# Patient Record
Sex: MALE | Race: WHITE
[De-identification: names, ages, dates, MRNs, and addresses within clinical notes are randomized per-mention and may not be internally consistent; named-entity substitution may affect disease eponyms.]

---

## 2017-02-20 ENCOUNTER — HOSPITAL ENCOUNTER (EMERGENCY)
Dept: HOSPITAL 47 - EC | Age: 77
Discharge: HOME | End: 2017-02-20
Payer: MEDICARE

## 2017-02-20 VITALS — SYSTOLIC BLOOD PRESSURE: 140 MMHG | HEART RATE: 69 BPM | DIASTOLIC BLOOD PRESSURE: 81 MMHG | RESPIRATION RATE: 18 BRPM

## 2017-02-20 VITALS — TEMPERATURE: 98 F

## 2017-02-20 DIAGNOSIS — Z79.82: ICD-10-CM

## 2017-02-20 DIAGNOSIS — J42: ICD-10-CM

## 2017-02-20 DIAGNOSIS — Z79.52: ICD-10-CM

## 2017-02-20 DIAGNOSIS — E07.9: ICD-10-CM

## 2017-02-20 DIAGNOSIS — M79.604: Primary | ICD-10-CM

## 2017-02-20 DIAGNOSIS — Z87.891: ICD-10-CM

## 2017-02-20 DIAGNOSIS — M25.561: ICD-10-CM

## 2017-02-20 PROCEDURE — 99284 EMERGENCY DEPT VISIT MOD MDM: CPT
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
## 2017-02-20 NOTE — US
EXAMINATION TYPE: US venous doppler duplex LE RT

 

DATE OF EXAM: 2/20/2017 9:32 AM

 

TECHNIQUE: Duplex Doppler ultrasound examination of the right lower extremity.

 

COMPARISON: NONE

 

CLINICAL HISTORY: 76-year-old male with Pain.

 

SIDE PERFORMED: Right  

 

FINDINGS:

 

VESSELS IMAGED:

External Iliac Vein (EIV)

Common Femoral Vein

Deep Femoral Vein

Greater Saphenous Vein *

Femoral Vein

Popliteal Vein

Small Saphenous Vein *

Proximal Calf Veins

(* superficial vessels)

 

Right Leg:  Negative for DVT

 

 

 

 

 

IMPRESSION:  

No evidence for DVT within the right lower extremity imaged from the groin to the upper calf.

## 2017-02-20 NOTE — XR
EXAMINATION TYPE: XR knee complete RT

 

DATE OF EXAM: 2/20/2017 9:39 AM

 

CLINICAL HISTORY: pain

 

TECHNIQUE: Frontal, lateral and oblique images of the right foot are obtained.

 

COMPARISON: None.

 

FINDINGS:  There is no acute fracture/dislocation evident. The joint spaces  appear mildly narrowed. 
Small patellar joint effusion noted. The overlying soft tissue appears unremarkable.

 

IMPRESSION:  

There is no acute fracture or dislocation.

 

ICD 10 NO FRACTURE, INITIAL EVALUATION

## 2018-04-10 ENCOUNTER — HOSPITAL ENCOUNTER (INPATIENT)
Dept: HOSPITAL 47 - EC | Age: 78
LOS: 3 days | Discharge: HOME | DRG: 381 | End: 2018-04-13
Payer: MEDICARE

## 2018-04-10 VITALS — BODY MASS INDEX: 29.2 KG/M2

## 2018-04-10 DIAGNOSIS — Z98.41: ICD-10-CM

## 2018-04-10 DIAGNOSIS — D62: ICD-10-CM

## 2018-04-10 DIAGNOSIS — R47.02: ICD-10-CM

## 2018-04-10 DIAGNOSIS — Z79.82: ICD-10-CM

## 2018-04-10 DIAGNOSIS — K57.30: ICD-10-CM

## 2018-04-10 DIAGNOSIS — I10: ICD-10-CM

## 2018-04-10 DIAGNOSIS — E03.9: ICD-10-CM

## 2018-04-10 DIAGNOSIS — H26.9: ICD-10-CM

## 2018-04-10 DIAGNOSIS — Z72.89: ICD-10-CM

## 2018-04-10 DIAGNOSIS — K44.9: ICD-10-CM

## 2018-04-10 DIAGNOSIS — E78.5: ICD-10-CM

## 2018-04-10 DIAGNOSIS — K21.9: ICD-10-CM

## 2018-04-10 DIAGNOSIS — K22.70: Primary | ICD-10-CM

## 2018-04-10 DIAGNOSIS — Z79.890: ICD-10-CM

## 2018-04-10 DIAGNOSIS — Z87.891: ICD-10-CM

## 2018-04-10 DIAGNOSIS — J42: ICD-10-CM

## 2018-04-10 LAB
ALBUMIN SERPL-MCNC: 4 G/DL (ref 3.5–5)
ALP SERPL-CCNC: 56 U/L (ref 38–126)
ALT SERPL-CCNC: 29 U/L (ref 21–72)
ANION GAP SERPL CALC-SCNC: 14 MMOL/L
APTT BLD: 21.7 SEC (ref 22–30)
AST SERPL-CCNC: 25 U/L (ref 17–59)
BASOPHILS # BLD AUTO: 0.1 K/UL (ref 0–0.2)
BASOPHILS NFR BLD AUTO: 1 %
BUN SERPL-SCNC: 30 MG/DL (ref 9–20)
CALCIUM SPEC-MCNC: 9.1 MG/DL (ref 8.4–10.2)
CHLORIDE SERPL-SCNC: 101 MMOL/L (ref 98–107)
CK SERPL-CCNC: 66 U/L (ref 55–170)
CO2 SERPL-SCNC: 25 MMOL/L (ref 22–30)
EOSINOPHIL # BLD AUTO: 0.2 K/UL (ref 0–0.7)
EOSINOPHIL NFR BLD AUTO: 3 %
ERYTHROCYTE [DISTWIDTH] IN BLOOD BY AUTOMATED COUNT: 4.4 M/UL (ref 4.3–5.9)
ERYTHROCYTE [DISTWIDTH] IN BLOOD: 13.2 % (ref 11.5–15.5)
GLUCOSE SERPL-MCNC: 133 MG/DL (ref 74–99)
HCT VFR BLD AUTO: 37.7 % (ref 39–53)
HGB BLD-MCNC: 13.2 GM/DL (ref 13–17.5)
INR PPP: 1 (ref ?–1.2)
LIPASE SERPL-CCNC: 166 U/L (ref 23–300)
LYMPHOCYTES # SPEC AUTO: 1.9 K/UL (ref 1–4.8)
LYMPHOCYTES NFR SPEC AUTO: 28 %
MAGNESIUM SPEC-SCNC: 2 MG/DL (ref 1.6–2.3)
MCH RBC QN AUTO: 29.9 PG (ref 25–35)
MCHC RBC AUTO-ENTMCNC: 34.9 G/DL (ref 31–37)
MCV RBC AUTO: 85.6 FL (ref 80–100)
MONOCYTES # BLD AUTO: 0.5 K/UL (ref 0–1)
MONOCYTES NFR BLD AUTO: 7 %
NEUTROPHILS # BLD AUTO: 4.2 K/UL (ref 1.3–7.7)
NEUTROPHILS NFR BLD AUTO: 60 %
PLATELET # BLD AUTO: 208 K/UL (ref 150–450)
POTASSIUM SERPL-SCNC: 4.2 MMOL/L (ref 3.5–5.1)
PROT SERPL-MCNC: 6.4 G/DL (ref 6.3–8.2)
PT BLD: 9.6 SEC (ref 9–12)
SODIUM SERPL-SCNC: 140 MMOL/L (ref 137–145)
TROPONIN I SERPL-MCNC: <0.012 NG/ML (ref 0–0.03)
WBC # BLD AUTO: 7 K/UL (ref 3.8–10.6)

## 2018-04-10 PROCEDURE — 85610 PROTHROMBIN TIME: CPT

## 2018-04-10 PROCEDURE — 80048 BASIC METABOLIC PNL TOTAL CA: CPT

## 2018-04-10 PROCEDURE — 84100 ASSAY OF PHOSPHORUS: CPT

## 2018-04-10 PROCEDURE — 43239 EGD BIOPSY SINGLE/MULTIPLE: CPT

## 2018-04-10 PROCEDURE — 86850 RBC ANTIBODY SCREEN: CPT

## 2018-04-10 PROCEDURE — 99285 EMERGENCY DEPT VISIT HI MDM: CPT

## 2018-04-10 PROCEDURE — 80053 COMPREHEN METABOLIC PANEL: CPT

## 2018-04-10 PROCEDURE — 86901 BLOOD TYPING SEROLOGIC RH(D): CPT

## 2018-04-10 PROCEDURE — 80320 DRUG SCREEN QUANTALCOHOLS: CPT

## 2018-04-10 PROCEDURE — 88305 TISSUE EXAM BY PATHOLOGIST: CPT

## 2018-04-10 PROCEDURE — 36415 COLL VENOUS BLD VENIPUNCTURE: CPT

## 2018-04-10 PROCEDURE — 83690 ASSAY OF LIPASE: CPT

## 2018-04-10 PROCEDURE — 85025 COMPLETE CBC W/AUTO DIFF WBC: CPT

## 2018-04-10 PROCEDURE — 81003 URINALYSIS AUTO W/O SCOPE: CPT

## 2018-04-10 PROCEDURE — 85027 COMPLETE CBC AUTOMATED: CPT

## 2018-04-10 PROCEDURE — 74022 RADEX COMPL AQT ABD SERIES: CPT

## 2018-04-10 PROCEDURE — 82553 CREATINE MB FRACTION: CPT

## 2018-04-10 PROCEDURE — 83735 ASSAY OF MAGNESIUM: CPT

## 2018-04-10 PROCEDURE — 85730 THROMBOPLASTIN TIME PARTIAL: CPT

## 2018-04-10 PROCEDURE — 84484 ASSAY OF TROPONIN QUANT: CPT

## 2018-04-10 PROCEDURE — 96374 THER/PROPH/DIAG INJ IV PUSH: CPT

## 2018-04-10 PROCEDURE — 82550 ASSAY OF CK (CPK): CPT

## 2018-04-10 PROCEDURE — 86900 BLOOD TYPING SEROLOGIC ABO: CPT

## 2018-04-10 NOTE — XR
EXAMINATION TYPE: XR abdomen acute w cxr

 

DATE OF EXAM: 4/10/2018

 

COMPARISON: Chest x-ray 10/27/2015

 

HISTORY: Vomiting blood. Chest pain.

 

TECHNIQUE:  4 views

 

FINDINGS:  

 

Lungs are clear. There is no heart failure. Heart size is normal.

 

There is no pleural effusion. Bowel gas pattern is normal. There is no sign of intestinal obstruction
 or pneumoperitoneum. Fecal pattern is normal. There are no pathologic calcifications over the kidney
s.

IMPRESSION: 

Nonacute abdomen. Chest is stable compared to old exam.
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
## 2018-04-11 LAB
ANION GAP SERPL CALC-SCNC: 7 MMOL/L
BASOPHILS # BLD AUTO: 0 K/UL (ref 0–0.2)
BASOPHILS NFR BLD AUTO: 1 %
BUN SERPL-SCNC: 31 MG/DL (ref 9–20)
CALCIUM SPEC-MCNC: 8.3 MG/DL (ref 8.4–10.2)
CHLORIDE SERPL-SCNC: 106 MMOL/L (ref 98–107)
CO2 SERPL-SCNC: 28 MMOL/L (ref 22–30)
EOSINOPHIL # BLD AUTO: 0.1 K/UL (ref 0–0.7)
EOSINOPHIL NFR BLD AUTO: 1 %
ERYTHROCYTE [DISTWIDTH] IN BLOOD BY AUTOMATED COUNT: 3.42 M/UL (ref 4.3–5.9)
ERYTHROCYTE [DISTWIDTH] IN BLOOD BY AUTOMATED COUNT: 3.58 M/UL (ref 4.3–5.9)
ERYTHROCYTE [DISTWIDTH] IN BLOOD BY AUTOMATED COUNT: 3.72 M/UL (ref 4.3–5.9)
ERYTHROCYTE [DISTWIDTH] IN BLOOD BY AUTOMATED COUNT: 3.74 M/UL (ref 4.3–5.9)
ERYTHROCYTE [DISTWIDTH] IN BLOOD: 13 % (ref 11.5–15.5)
ERYTHROCYTE [DISTWIDTH] IN BLOOD: 13.3 % (ref 11.5–15.5)
ERYTHROCYTE [DISTWIDTH] IN BLOOD: 13.3 % (ref 11.5–15.5)
ERYTHROCYTE [DISTWIDTH] IN BLOOD: 13.4 % (ref 11.5–15.5)
GLUCOSE BLD-MCNC: 127 MG/DL (ref 75–99)
GLUCOSE SERPL-MCNC: 108 MG/DL (ref 74–99)
HCT VFR BLD AUTO: 29.5 % (ref 39–53)
HCT VFR BLD AUTO: 31.1 % (ref 39–53)
HCT VFR BLD AUTO: 31.9 % (ref 39–53)
HCT VFR BLD AUTO: 32.5 % (ref 39–53)
HGB BLD-MCNC: 10.2 GM/DL (ref 13–17.5)
HGB BLD-MCNC: 10.6 GM/DL (ref 13–17.5)
HGB BLD-MCNC: 11 GM/DL (ref 13–17.5)
HGB BLD-MCNC: 11.1 GM/DL (ref 13–17.5)
LYMPHOCYTES # SPEC AUTO: 0.9 K/UL (ref 1–4.8)
LYMPHOCYTES NFR SPEC AUTO: 17 %
MAGNESIUM SPEC-SCNC: 2 MG/DL (ref 1.6–2.3)
MCH RBC QN AUTO: 29.3 PG (ref 25–35)
MCH RBC QN AUTO: 29.7 PG (ref 25–35)
MCH RBC QN AUTO: 29.7 PG (ref 25–35)
MCH RBC QN AUTO: 29.9 PG (ref 25–35)
MCHC RBC AUTO-ENTMCNC: 33.7 G/DL (ref 31–37)
MCHC RBC AUTO-ENTMCNC: 34.1 G/DL (ref 31–37)
MCHC RBC AUTO-ENTMCNC: 34.5 G/DL (ref 31–37)
MCHC RBC AUTO-ENTMCNC: 34.9 G/DL (ref 31–37)
MCV RBC AUTO: 85.6 FL (ref 80–100)
MCV RBC AUTO: 86.3 FL (ref 80–100)
MCV RBC AUTO: 86.8 FL (ref 80–100)
MCV RBC AUTO: 87.1 FL (ref 80–100)
MONOCYTES # BLD AUTO: 0.4 K/UL (ref 0–1)
MONOCYTES NFR BLD AUTO: 6 %
NEUTROPHILS # BLD AUTO: 4.1 K/UL (ref 1.3–7.7)
NEUTROPHILS NFR BLD AUTO: 72 %
PH UR: 7 [PH] (ref 5–8)
PLATELET # BLD AUTO: 160 K/UL (ref 150–450)
PLATELET # BLD AUTO: 174 K/UL (ref 150–450)
PLATELET # BLD AUTO: 175 K/UL (ref 150–450)
PLATELET # BLD AUTO: 176 K/UL (ref 150–450)
POTASSIUM SERPL-SCNC: 4.7 MMOL/L (ref 3.5–5.1)
SODIUM SERPL-SCNC: 141 MMOL/L (ref 137–145)
SP GR UR: 1.01 (ref 1–1.03)
UROBILINOGEN UR QL STRIP: <2 MG/DL (ref ?–2)
WBC # BLD AUTO: 4 K/UL (ref 3.8–10.6)
WBC # BLD AUTO: 5 K/UL (ref 3.8–10.6)
WBC # BLD AUTO: 5.1 K/UL (ref 3.8–10.6)
WBC # BLD AUTO: 5.6 K/UL (ref 3.8–10.6)

## 2018-04-11 RX ADMIN — PANTOPRAZOLE SODIUM SCH MG: 40 INJECTION, POWDER, FOR SOLUTION INTRAVENOUS at 09:38

## 2018-04-11 RX ADMIN — CEFAZOLIN SCH MLS/HR: 330 INJECTION, POWDER, FOR SOLUTION INTRAMUSCULAR; INTRAVENOUS at 01:39

## 2018-04-11 RX ADMIN — PANTOPRAZOLE SODIUM SCH MG: 40 INJECTION, POWDER, FOR SOLUTION INTRAVENOUS at 20:11

## 2018-04-11 RX ADMIN — CEFAZOLIN SCH MLS/HR: 330 INJECTION, POWDER, FOR SOLUTION INTRAMUSCULAR; INTRAVENOUS at 09:38

## 2018-04-11 RX ADMIN — CEFAZOLIN SCH MLS/HR: 330 INJECTION, POWDER, FOR SOLUTION INTRAMUSCULAR; INTRAVENOUS at 20:10
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
## 2018-04-12 VITALS — RESPIRATION RATE: 18 BRPM

## 2018-04-12 LAB
ANION GAP SERPL CALC-SCNC: 8 MMOL/L
BASOPHILS # BLD AUTO: 0 K/UL (ref 0–0.2)
BASOPHILS # BLD AUTO: 0 K/UL (ref 0–0.2)
BASOPHILS NFR BLD AUTO: 1 %
BASOPHILS NFR BLD AUTO: 1 %
BUN SERPL-SCNC: 15 MG/DL (ref 9–20)
CALCIUM SPEC-MCNC: 8.2 MG/DL (ref 8.4–10.2)
CHLORIDE SERPL-SCNC: 110 MMOL/L (ref 98–107)
CO2 SERPL-SCNC: 23 MMOL/L (ref 22–30)
EOSINOPHIL # BLD AUTO: 0.1 K/UL (ref 0–0.7)
EOSINOPHIL # BLD AUTO: 0.1 K/UL (ref 0–0.7)
EOSINOPHIL NFR BLD AUTO: 3 %
EOSINOPHIL NFR BLD AUTO: 3 %
ERYTHROCYTE [DISTWIDTH] IN BLOOD BY AUTOMATED COUNT: 3.51 M/UL (ref 4.3–5.9)
ERYTHROCYTE [DISTWIDTH] IN BLOOD BY AUTOMATED COUNT: 3.56 M/UL (ref 4.3–5.9)
ERYTHROCYTE [DISTWIDTH] IN BLOOD: 13.4 % (ref 11.5–15.5)
ERYTHROCYTE [DISTWIDTH] IN BLOOD: 13.5 % (ref 11.5–15.5)
GLUCOSE SERPL-MCNC: 96 MG/DL (ref 74–99)
HCT VFR BLD AUTO: 30.7 % (ref 39–53)
HCT VFR BLD AUTO: 30.9 % (ref 39–53)
HGB BLD-MCNC: 10.4 GM/DL (ref 13–17.5)
HGB BLD-MCNC: 10.6 GM/DL (ref 13–17.5)
LYMPHOCYTES # SPEC AUTO: 0.6 K/UL (ref 1–4.8)
LYMPHOCYTES # SPEC AUTO: 1 K/UL (ref 1–4.8)
LYMPHOCYTES NFR SPEC AUTO: 17 %
LYMPHOCYTES NFR SPEC AUTO: 26 %
MAGNESIUM SPEC-SCNC: 2.2 MG/DL (ref 1.6–2.3)
MCH RBC QN AUTO: 29.5 PG (ref 25–35)
MCH RBC QN AUTO: 29.6 PG (ref 25–35)
MCHC RBC AUTO-ENTMCNC: 33.6 G/DL (ref 31–37)
MCHC RBC AUTO-ENTMCNC: 34.4 G/DL (ref 31–37)
MCV RBC AUTO: 86.1 FL (ref 80–100)
MCV RBC AUTO: 87.9 FL (ref 80–100)
MONOCYTES # BLD AUTO: 0.2 K/UL (ref 0–1)
MONOCYTES # BLD AUTO: 0.3 K/UL (ref 0–1)
MONOCYTES NFR BLD AUTO: 6 %
MONOCYTES NFR BLD AUTO: 7 %
NEUTROPHILS # BLD AUTO: 2.3 K/UL (ref 1.3–7.7)
NEUTROPHILS # BLD AUTO: 2.7 K/UL (ref 1.3–7.7)
NEUTROPHILS NFR BLD AUTO: 60 %
NEUTROPHILS NFR BLD AUTO: 72 %
PLATELET # BLD AUTO: 151 K/UL (ref 150–450)
PLATELET # BLD AUTO: 156 K/UL (ref 150–450)
POTASSIUM SERPL-SCNC: 4.2 MMOL/L (ref 3.5–5.1)
SODIUM SERPL-SCNC: 141 MMOL/L (ref 137–145)
WBC # BLD AUTO: 3.7 K/UL (ref 3.8–10.6)
WBC # BLD AUTO: 3.8 K/UL (ref 3.8–10.6)

## 2018-04-12 PROCEDURE — 0DB58ZX EXCISION OF ESOPHAGUS, VIA NATURAL OR ARTIFICIAL OPENING ENDOSCOPIC, DIAGNOSTIC: ICD-10-PCS

## 2018-04-12 RX ADMIN — PANTOPRAZOLE SODIUM SCH MG: 40 INJECTION, POWDER, FOR SOLUTION INTRAVENOUS at 20:54

## 2018-04-12 RX ADMIN — PANTOPRAZOLE SODIUM SCH MG: 40 INJECTION, POWDER, FOR SOLUTION INTRAVENOUS at 07:50

## 2018-04-12 RX ADMIN — LEVOTHYROXINE SODIUM SCH MCG: 25 TABLET ORAL at 15:42

## 2018-04-12 RX ADMIN — CEFAZOLIN SCH MLS/HR: 330 INJECTION, POWDER, FOR SOLUTION INTRAMUSCULAR; INTRAVENOUS at 07:50

## 2018-04-12 RX ADMIN — CEFAZOLIN SCH MLS/HR: 330 INJECTION, POWDER, FOR SOLUTION INTRAMUSCULAR; INTRAVENOUS at 17:18

## 2018-04-12 RX ADMIN — Medication SCH MG: at 15:41

## 2018-04-12 RX ADMIN — CEFAZOLIN SCH MLS/HR: 330 INJECTION, POWDER, FOR SOLUTION INTRAMUSCULAR; INTRAVENOUS at 04:22

## 2018-04-12 NOTE — P.PN
Subjective


Progress Note Date: 04/12/18


Principal diagnosis: 


Acute blood loss anemia with coffee ground hematemesis and black tarry stools





Khoa is a 78-year-old white male patient of Dr. Boudreaux who presented to the 

emergency department on 04/10/2018 with 3 day history of black stools and 

coffee ground emesis.  In the emergency room room he also had some burgundy-

colored clots in the emesis upon arrival.  Denied any fevers, denied any 

chills.  Denied any lightheadedness or chest pain.  Denied any prior history of 

GI bleeding.  He drinks 2 beers on a regular basis, denies drinking any liquor.

  Acute abdominal series showed nonacute abdomen, and a stable chest.  Patient 

had a previous colonoscopy on 08/09/2016 by Dr. Werner, which showed sigmoid 

diverticulosis with no evidence of acute diverticulitis, strictures, polyps or 

cancer.  Other past medical history includes GERD/reflux, hyperlipidemia, 

hypertension, hypothyroidism, varicose veins for which patient follows with the 

vascular surgeon, nicotine dependence, which is currently in remission.  

Surgical history includes appendectomy, hernia repair, hemorrhoidectomy, 

cataract surgery on the right eye.  Initial lab work showed hemoglobin of 13.2 

on 04/10/2018, white count of 7.0, no evidence of coagulopathy, INR was 1.0, 

electrolytes were within normal limits, BUN was 30, creatinine is 1.0.  Liver 

enzymes, lipase were all within normal limits, cardiac enzymes and troponins 

were negative times one.  Serum alcohol was less than 10.  His labs hemoglobin 

is down to 11.1.  Patient has had no further episodes of hematemesis or black 

tarry stool since admission to the ICU.  Vital signs remain stable, he is 

hemodynamically stable.  He is awake alert, not requiring any supplemental 

oxygen.  He is afebrile.  Denies any distress, denies any chest pain or dyspnea

, denies any abdominal discomfort.  Patient remains nothing by mouth, and is 

awaiting to be evaluated by the GI service for possible EGD. 





On 04/12/2018 patient seen in follow-up in the intensive care unit.  He had one 

episode of dark stool since admission, it is hemoglobin is 10.4, no further 

episodes of hematemesis or coffee-ground emesis.  No leukocytosis, renal 

profile is within normal limits, electrolytes are within normal limits with 

exception of chloride which is at 110 on today's blood work.  Patient remains 

hemodynamically stable, he has not required any blood transfusions.  Denies any 

abdominal discomfort, denies any chest pain or dyspnea.  He is on room air, O2 

sat at 95%.  Maintenance IVs 0.9 at 125 ml/hr.  Patient is scheduled for EGD 

today. 








Objective





- Vital Signs


Vital signs: 


 Vital Signs











Temp  97.7 F   04/12/18 04:00


 


Pulse  76   04/12/18 07:00


 


Resp  17   04/12/18 07:00


 


BP  106/72   04/12/18 07:00


 


Pulse Ox  95   04/12/18 07:00








 Intake & Output











 04/11/18 04/12/18 04/12/18





 18:59 06:59 18:59


 


Intake Total 1825 2220 250


 


Output Total 2050 1075 325


 


Balance -225 1145 -75


 


Weight  86.1 kg 


 


Intake:   


 


  IV 1375 1500 250


 


    Sodium Chloride 0.9% 1, 1375 1500 250





    000 ml @ 125 mls/hr IV .   





    [facility] Rx#:[id number]   


 


  Oral 450 720 


 


Output:   


 


  Urine 2050 1075 325


 


Other:   


 


  Voiding Method Urinal Urinal 














- Exam


GENERAL EXAM: Alert, pleasant, 78-year-old white male that appears younger than 

his stated age, comfortable in no apparent distress.


HEAD: Normocephalic/atraumatic.


EYES: Normal reaction of pupils, equal size.  Conjunctiva pink, sclera white.


NOSE: Clear with pink turbinates.


THROAT: No erythema or exudates.


NECK: No masses, no JVD, no thyroid enlargement, no adenopathy.


CHEST: No chest wall deformity.  Symmetrical expansion. 


LUNGS: Clear lung sounds, no rhonchi, no wheezes or rales noted on today's exam.


CVS: Regular rate and rhythm, normal S1 and S2, no gallops, no murmurs, no rubs


ABDOMEN: Soft, nontender.  No hepatosplenomegaly, normal bowel sounds, no 

guarding or rigidity.


EXTREMITIES: No clubbing, no edema, no cyanosis, 2+ pulses and upper and lower 

extremities.


MUSCULOSKELETAL: Muscle strength and tone normal.


SPINE: No scoliosis or deformity


SKIN: No rashes


CENTRAL NERVOUS SYSTEM: Alert and oriented -3.  No focal deficits, tone is 

normal in all 4 extremities.


PSYCHIATRIC: Alert and oriented -3.  Appropriate affect.  Intact judgment and 

insight.











- Labs


CBC & Chem 7: 


 04/12/18 04:04





 04/12/18 04:04


Labs: 


 Abnormal Lab Results - Last 24 Hours (Table)











  04/11/18 04/11/18 04/11/18 Range/Units





  11:32 17:15 23:13 


 


RBC  3.74 L  3.58 L  3.42 L  (4.30-5.90)  m/uL


 


Hgb  11.0 L  10.6 L  10.2 L  (13.0-17.5)  gm/dL


 


Hct  32.5 L  31.1 L  29.5 L  (39.0-53.0)  %


 


Chloride     ()  mmol/L


 


Calcium     (8.4-10.2)  mg/dL














  04/12/18 04/12/18 Range/Units





  04:04 04:04 


 


RBC   3.51 L  (4.30-5.90)  m/uL


 


Hgb   10.4 L  (13.0-17.5)  gm/dL


 


Hct   30.9 L  (39.0-53.0)  %


 


Chloride  110 H   ()  mmol/L


 


Calcium  8.2 L   (8.4-10.2)  mg/dL














Assessment and Plan


Plan: 


Assessment:





#1.  Acute blood loss anemia with coffee ground hematemesis, and black tarry 

stools.  Hemoglobin went down from 13.2, down to 10.4





#2.  Chronic EtOH use, 2-3 beers on the daily basis, but no evidence of liver 

disease.





#3.  Dysphasia, has been treated with rounds of antibiotics per PCP.  limited 

oropharyngeal exam was negative for any evidence of exudates, redness, or 

swelling





#4.  History of diverticulosis as per colonoscopy from 08/09/2016





#5.  Hypertension, hyperlipidemia





#6.  GERD/reflux





#7.  Hypothyroidism





#8.  History of appendectomy, hernia repair, hemorrhoidectomy





#9.  Nicotine dependence, currently in remission





Plan:





Will await the results of the EGD today, patient has not had any recurrence of 

hematemesis or coffee ground emesis.  He states he had one episode of dark 

stool since admission, hemoglobin is down to 10.4 on today's exam, patient 

remains hemodynamically stable, denies any chest pain or dyspnea.  Patient can 

be transferred to the general medical floor today pending the results of the 

EGD.





I performed a history & physical examination of the patient and discussed their 

management with my nurse practitioner, Ariadna Foley.  I reviewed the nurse 

practitioner's note and agree with the documented findings and plan of care.  

Lung sounds are clear.  The findings and the impression was discussed with the 

patient.  I attest to the documentation by the nurse practitioner. 





Critical care time is over 45 minutes





Time with Patient: Greater than 30

## 2018-04-12 NOTE — P.PCN
Date of Procedure: 04/12/18


Procedure(s) Performed: 


Procedure: Esophagogastroduodenoscopy and biopsy.





Preoperative diagnosis: Upper GI bleeding.





Postoperative diagnosis: 1. 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 

with ulcerated surface but no active bleeding at the time of this exam.        

  2. Sliding hiatal hernia.  3. No obvious abnormalities in the stomach and 

duodenum.  4. Multiple biopsies obtained from the esophageal mass and a picture 

was taken to be included in the record.





Preparation and sedation: Was provided by anesthesia.





Brief clinical history: The patient is a 78-year-old male with a past medical 

history of GERD, hyperlipidemia, hypertension, daily beer consumption 2-3 beers 

a day admitted with acute hematemesis and melena 2 days.  Patient's had one 

moderate black colored bowel movement daily for 2 days prior to admission, as 

well as a few episodes of maroon colored hematemesis the day before admission.  

Reports mild indigestion and mild midepigastric discomfort.  No history of 

peptic ulcer disease or recent EGD.  Colonoscopy August 2016 screening for 

change in bowel habits identified sigmoid diverticulosis. Takes a baby aspirin 

daily no other antiplatelet or NSAID medications.  Denies weight loss, 

hematochezia, fever or chills.  Admission hemoglobin 13.2 dropped to 11.1.  

Platelet 175.  INR 1.0.  BUN 30.  Creatinine 1.0.  LFTs within normal limits.  

Lipase 166.  Acute abdominal series nonacute abdomen.  The details are 

summarized in the history and physical and dictated consultation and progress 

notes.  This evaluation is to assess for a source of upper GI bleeding.





Procedure: With the patient on his left lateral decubitus position and after 

informed consent and adequate sedation, I passed the Olympus- video 

upper endoscope through the cricopharyngeus down the esophagus.  GE junction 

was irregular and it started around 35-36 cm from the incisors and the tubular 

esophagus continues for another 4-5 cm.  The endoscope was then advanced 

through a hiatal hernia to the rest of the stomach which was insufflated with 

air and inspected in detail including the retroflex view in the cardia.  Finally

, the endoscope was passed through the pylorus into the duodenum.  Stomach, 

pyloric channel, duodenal bulb, post bulbar area and descending duodenum did 

not show any obvious abnormalities or bleeding.  The distal esophagus in the 

Colindres's segment showed an ulcerated mass measuring around 3 or 4 cm in 

greatest dimension.  There was friability but no spontaneous bleeding.  No 

other obvious abnormalities were seen in the esophagus or any evidence of 

active bleeding noted in this exam.  I obtained multiple biopsies from the 

esophageal mass as well as couple pictures then the endoscope was withdrawn.





The patient tolerated the procedure well.





Plan: The patient was briefed regarding the findings on this exam.  Will await 

biopsy results and make further plans.

## 2018-04-12 NOTE — P.PN
Subjective


Progress Note Date: 04/12/18





Khoa Fuentes is a 78-year-old male well known to my practice who presented to 

the emergency department on 04/10/2018 with 3 day history of black stools and 

coffee ground emesis.  In the emergency room room he also had more bloody 

emesis with blood clots in the emesis.  .  Denied any lightheadedness or chest 

pain.  He denied any prior history of GI bleeding.  He drinks 2-3 beers daily, 

denies drinking any liquor.  Patient had a previous colonoscopy in 2016 by Dr. Lopez, which showed sigmoid diverticulosis.


Initial lab work showed hemoglobin of 13.2 on 04/10/2018, white count of 7.0, 

no evidence of coagulopathy, INR was 1.0, electrolytes were within normal limits

, BUN was 30, creatinine is 1.0.  Liver enzymes, lipase were all within normal 

limits. Serum alcohol was less than 10.  His labs hemoglobin is down to 11.1.  

Patient has had no further episodes of hematemesis or black tarry stool since 

admission to the ICU.  Vital signs remain stable, he is hemodynamically stable.

  


He is awake alert, not requiring any supplemental oxygen.  He is afebrile.  

Denies any distress, denies any chest pain or dyspnea, denies any abdominal 

discomfort.  Patient remains nothing by mouth, and is awaiting to be evaluated 

by the GI service for possible EGD. 





04/12/2018 patient lying in bed comfortably.  He is scheduled for an EGD this 

afternoon.  Hemoglobin 10.4.  He's had no further episodes of vomiting since in 

the ER.  Denies abdominal pain.  Denies any chest pain or shortness of breath.  

Patient did have one dark stool yesterday.  Denies any burning with urination.  

Denies any tremors.  Denies any anxiety or agitation.





Objective





- Vital Signs


Vital signs: 


 Vital Signs











Temp  98.1 F   04/12/18 08:00


 


Pulse  70   04/12/18 09:00


 


Resp  16   04/12/18 09:00


 


BP  109/72   04/12/18 09:00


 


Pulse Ox  95   04/12/18 09:00








 Intake & Output











 04/11/18 04/12/18 04/12/18





 18:59 06:59 18:59


 


Intake Total 1825 2220 500


 


Output Total 2050 1075 600


 


Balance -225 1145 -100


 


Weight  86.1 kg 


 


Intake:   


 


  IV 1375 1500 500


 


    Sodium Chloride 0.9% 1, 1375 1500 500





    000 ml @ 125 mls/hr IV .   





    Q8H [facility] Rx#:[id number]   


 


  Oral 450 720 


 


Output:   


 


  Urine 2050 1075 600


 


Other:   


 


  Voiding Method Urinal Urinal Urinal














- Exam





Head normocephalic


Neck supple


Lungs clear to auscultation bilaterally no wheezing or crackles


Heart regular rate and rhythm S1-S2, no rub or gallop


Abdomen is soft nontender nondistended positive bowel sounds no 

hepatosplenomegaly


Extremities no edema


Neuro alert and orientated to 3





- Labs


CBC & Chem 7: 


 04/12/18 04:04





 04/12/18 04:04


Labs: 


 Abnormal Lab Results - Last 24 Hours (Table)











  04/11/18 04/11/18 04/11/18 Range/Units





  11:32 17:15 23:13 


 


RBC  3.74 L  3.58 L  3.42 L  (4.30-5.90)  m/uL


 


Hgb  11.0 L  10.6 L  10.2 L  (13.0-17.5)  gm/dL


 


Hct  32.5 L  31.1 L  29.5 L  (39.0-53.0)  %


 


Chloride     ()  mmol/L


 


Calcium     (8.4-10.2)  mg/dL














  04/12/18 04/12/18 Range/Units





  04:04 04:04 


 


RBC   3.51 L  (4.30-5.90)  m/uL


 


Hgb   10.4 L  (13.0-17.5)  gm/dL


 


Hct   30.9 L  (39.0-53.0)  %


 


Chloride  110 H   ()  mmol/L


 


Calcium  8.2 L   (8.4-10.2)  mg/dL














Assessment and Plan


Assessment: 





#1 acute GI bleed, most likely upper GI tract.  Patient seen by GI service they'

ve planned EGD for today.  Continue IV Protonix 40 mg twice daily





#2 acute blood loss anemia.  Hemoglobin 10.4.  Continue to monitor CBC





#3 underlying history of chronic alcohol use patient drinks 2-3 beers daily





#4 underlying history of hypertension well-controlled on medications





#5 previously diagnosed with diverticulosis on colonoscopy in 2016





#6 history of gastroesophageal reflux disease





#7 history of hypothyroidism





#8 previous history of smoking





#9 daily alcohol use of 2-3 beers





I performed an examination of the patient and discussed their management with 

the physician Assistant.  I have reviewed the Physician Assistant's notes and 

agree with the documented findings and plan of care

## 2018-04-13 VITALS — TEMPERATURE: 98.3 F | HEART RATE: 76 BPM | SYSTOLIC BLOOD PRESSURE: 117 MMHG | DIASTOLIC BLOOD PRESSURE: 74 MMHG

## 2018-04-13 LAB
ANION GAP SERPL CALC-SCNC: 10 MMOL/L
BASOPHILS # BLD AUTO: 0 K/UL (ref 0–0.2)
BASOPHILS NFR BLD AUTO: 1 %
BUN SERPL-SCNC: 10 MG/DL (ref 9–20)
CALCIUM SPEC-MCNC: 8.7 MG/DL (ref 8.4–10.2)
CHLORIDE SERPL-SCNC: 109 MMOL/L (ref 98–107)
CO2 SERPL-SCNC: 25 MMOL/L (ref 22–30)
EOSINOPHIL # BLD AUTO: 0.2 K/UL (ref 0–0.7)
EOSINOPHIL NFR BLD AUTO: 4 %
ERYTHROCYTE [DISTWIDTH] IN BLOOD BY AUTOMATED COUNT: 3.75 M/UL (ref 4.3–5.9)
ERYTHROCYTE [DISTWIDTH] IN BLOOD: 13.4 % (ref 11.5–15.5)
GLUCOSE SERPL-MCNC: 94 MG/DL (ref 74–99)
HCT VFR BLD AUTO: 32.8 % (ref 39–53)
HGB BLD-MCNC: 11 GM/DL (ref 13–17.5)
LYMPHOCYTES # SPEC AUTO: 0.9 K/UL (ref 1–4.8)
LYMPHOCYTES NFR SPEC AUTO: 21 %
MAGNESIUM SPEC-SCNC: 2.2 MG/DL (ref 1.6–2.3)
MCH RBC QN AUTO: 29.2 PG (ref 25–35)
MCHC RBC AUTO-ENTMCNC: 33.4 G/DL (ref 31–37)
MCV RBC AUTO: 87.5 FL (ref 80–100)
MONOCYTES # BLD AUTO: 0.3 K/UL (ref 0–1)
MONOCYTES NFR BLD AUTO: 7 %
NEUTROPHILS # BLD AUTO: 2.8 K/UL (ref 1.3–7.7)
NEUTROPHILS NFR BLD AUTO: 65 %
PLATELET # BLD AUTO: 172 K/UL (ref 150–450)
POTASSIUM SERPL-SCNC: 4.3 MMOL/L (ref 3.5–5.1)
SODIUM SERPL-SCNC: 144 MMOL/L (ref 137–145)
WBC # BLD AUTO: 4.3 K/UL (ref 3.8–10.6)

## 2018-04-13 RX ADMIN — Medication SCH MG: at 08:12

## 2018-04-13 RX ADMIN — CEFAZOLIN SCH MLS/HR: 330 INJECTION, POWDER, FOR SOLUTION INTRAMUSCULAR; INTRAVENOUS at 00:45

## 2018-04-13 RX ADMIN — LEVOTHYROXINE SODIUM SCH MCG: 25 TABLET ORAL at 06:00

## 2018-04-13 RX ADMIN — CEFAZOLIN SCH MLS/HR: 330 INJECTION, POWDER, FOR SOLUTION INTRAMUSCULAR; INTRAVENOUS at 08:12

## 2018-04-13 RX ADMIN — PANTOPRAZOLE SODIUM SCH MG: 40 INJECTION, POWDER, FOR SOLUTION INTRAVENOUS at 07:41

## 2018-04-13 NOTE — P.DS
Providers


Date of admission: 


04/11/18 00:57





Expected date of discharge: 04/13/18


Attending physician: 


Zenia Boudreaux





Consults: 





 





04/11/18 00:57


Consult Physician Urgent 


   Consulting Provider: Pedro Aponte


   Consult Reason/Comments: ICU management for upper GI bleed


   Do you want consulting provider notified?: Yes











Primary care physician: 


Zenia Kanika





[facility] Course: 





Discharge diagnosis


#1 acute GI bleed likely secondary to esophageal mass in the segment of Colindres'

s esophagus with ulcerated surface.  Biopsies are pending.  Continue Protonix.  

We will hold aspirin until seen in the office





#2 acute blood loss anemia secondary to GI bleed





#3 underlying history of chronic alcohol use patient drinks 2-3 beers daily





#4 underlying history of hypertension well-controlled on medications





#5 previously diagnosed with diverticulosis on colonoscopy in 2016





#6 history of gastroesophageal reflux disease





#7 history of hypothyroidism





#8 previous history of smoking





#9 daily alcohol use of 2-3 beers








Hospital course





Khoa Fuentes is a 78-year-old male well known to my practice who presented to 

the emergency department on 04/10/2018 with 3 day history of black stools and 

coffee ground emesis.  In the emergency room room he also had more bloody 

emesis with blood clots in the emesis.  .  Denied any lightheadedness or chest 

pain.  He denied any prior history of GI bleeding.  He drinks 2-3 beers daily, 

denies drinking any liquor.  Patient had a previous colonoscopy in 2016 by Dr. Lopez, which showed sigmoid diverticulosis.


Initial lab work showed hemoglobin of 13.2 on 04/10/2018, white count of 7.0, 

no evidence of coagulopathy, INR was 1.0, electrolytes were within normal limits

, BUN was 30, creatinine is 1.0.  Liver enzymes, lipase were all within normal 

limits. Serum alcohol was less than 10.  His labs hemoglobin is down to 11.1.  

Patient has had no further episodes of hematemesis or black tarry stool since 

admission to the ICU.  Vital signs remain stable, he is hemodynamically stable.

  


He is awake alert, not requiring any supplemental oxygen.  He is afebrile.  

Denies any distress, denies any chest pain or dyspnea, denies any abdominal 

discomfort.  Patient remains nothing by mouth, and is awaiting to be evaluated 

by the GI service for possible EGD. 





Patient is status post EGD with results showing an esophageal mass in segment 

of Colindres's esophagus with ulcerated surface and no active bleed.  Patient is 

tolerating advancement of diet.  Currently on a full liquid.  He will receive a 

soft diet before discharge.  Is on is tolerating this will be discharged home.  

Hemoglobin at discharge was 11.  He is scheduled to follow-up with Dr. Estrada 

in 2-3 weeks.  Biopsy results are pending.  And depending on those results 

patient may need to be evaluated by oncology and outpatient setting.  Again his 

vomiting has resolved.  He's reports still some residual black stool but this 

is improving as well.  We'll continue holding patient's aspirin until he is 

evaluated in the office with Dr. Boudreaux.  And at that time would recommend 

checking a CBC.  Patient is medical stable for discharge and has been cleared 

by GI service.  GI service has written a prescription for Protonix 80 mg by 

mouth daily.  Patient has also been educated to refrain from alcohol use.  





I performed an examination of the patient and discussed their management with 

the physician Assistant.  I have reviewed the Physician Assistant's notes and 

agree with the documented findings and plan of care


Patient Condition at Discharge: Stable





Plan - Discharge Summary


Discharge Rx Participant: Yes


New Discharge Prescriptions: 


New


   Pantoprazole [Protonix] 40 mg PO DAILY #30 tablet.





Continue


   Levothyroxine Sodium [Synthroid] 25 mcg PO DAILY





Discontinued


   Aspirin 81 mg PO DAILY


Discharge Medication List





Levothyroxine Sodium [Synthroid] 25 mcg PO DAILY 08/05/16 [History]


Pantoprazole [Protonix] 40 mg PO DAILY #30 tablet. 04/13/18 [Rx]








Follow up Appointment(s)/Referral(s): 


Cedric Lopez MD [STAFF PHYSICIAN] - 04/30/18 3:00 pm


Zenia Boudreaux MD [Primary Care Provider] - 1 Week


Activity/Diet/Wound Care/Special Instructions: 


Diet: regular No ETOH


Activity: as tolerated





patient can be discharged this afternoon as long as he tolerates soft diet


Discharge Disposition: HOME SELF-CARE

## 2018-04-13 NOTE — P.PN
Subjective


Progress Note Date: 04/13/18


Principal diagnosis: 


GI bleed esophageal mass





Status post EGD for evaluation of hematemesis findings of esophageal mass in 

the segment of Tirado's esophagus with ulcerated surface status post biopsies.

  Presently tolerating advance full liquid diet.  No recurrence of hematemesis 

melena or hematochezia.  Denies abdominal pain.  Hemoglobin 11.0.








Objective





- Vital Signs


Vital signs: 


 Vital Signs











Temp  98.3 F   04/13/18 06:38


 


Pulse  76   04/13/18 06:38


 


Resp  18   04/13/18 06:38


 


BP  117/74   04/13/18 06:38


 


Pulse Ox  96   04/13/18 06:38








 Intake & Output











 04/12/18 04/13/18 04/13/18





 18:59 06:59 18:59


 


Intake Total 1100 900 


 


Output Total 1200  


 


Balance -100 900 


 


Intake:   


 


  IV 1100  


 


    Sodium Chloride 0.9% 1, 1000  





    000 ml @ 125 mls/hr IV .   





    Q8H [facility] Rx#:[id number]   


 


  Oral  900 


 


Output:   


 


  Urine 1200  


 


Other:   


 


  Voiding Method Urinal Toilet 





  Urinal 


 


  # Voids  2 














- Exam


General appearance: The patient is alert, oriented, in no acute distress.


HET: Head is normocephalic and atraumatic.  Pupils are equal and reactive.  

Oropharynx is clear without lesions.


Neck: Supple without lymphadenopathy.  Trachea midline.


Heart: S1 S2.  Regular rate and rhythm.


Lungs: No crackles or wheezes are heard.


Abdomen: Soft, nontender, nondistended with  bowel sounds.  No peritoneal 

signs.  No palpable organomegaly or masses.


Extremities: Normal skin color and turgor.  No cyanosis, rash, ulceration, 

clubbing, or edema.  Radial and pedal pulses are 2/4 bilaterally.


Neurological: No focal deficits.  Strength and sensation are grossly intact.








- Labs


CBC & Chem 7: 


 04/13/18 07:45





 04/13/18 07:45


Labs: 


 Abnormal Lab Results - Last 24 Hours (Table)











  04/12/18 04/13/18 04/13/18 Range/Units





  11:02 07:45 07:45 


 


WBC  3.7 L    (3.8-10.6)  k/uL


 


RBC  3.56 L   3.75 L  (4.30-5.90)  m/uL


 


Hgb  10.6 L   11.0 L  (13.0-17.5)  gm/dL


 


Hct  30.7 L   32.8 L  (39.0-53.0)  %


 


Lymphocytes #  0.6 L   0.9 L  (1.0-4.8)  k/uL


 


Chloride   109 H   ()  mmol/L














Assessment and Plan


(1) Hematemesis


Current Visit: Yes   Status: Acute   Code(s): K92.0 - HEMATEMESIS   SNOMED Code(

s): 5209612


   





(2) Acute blood loss anemia


Current Visit: Yes   Status: Acute   Code(s): D62 - ACUTE POSTHEMORRHAGIC 

ANEMIA   SNOMED Code(s): 134059441


   





(3) Consumes three beers daily


Current Visit: Yes   Status: Acute   Code(s): Z78.9 - OTHER SPECIFIED HEALTH 

STATUS   SNOMED Code(s): 590678384


   





(4) Melena


Current Visit: Yes   Status: Acute   Code(s): K92.1 - MELENA   SNOMED Code(s): 

3036791


   





(5) Upper GI bleed


Current Visit: Yes   Status: Acute   Code(s): K92.2 - GASTROINTESTINAL 

HEMORRHAGE, UNSPECIFIED   SNOMED Code(s): 27277622


   





(6) Esophageal mass


Current Visit: Yes   Status: Acute   Code(s): K22.9 - DISEASE OF ESOPHAGUS, 

UNSPECIFIED   SNOMED Code(s): 569318280


   





(7) Tirado's esophagus determined by endoscopy


Current Visit: Yes   Status: Acute   Code(s): K22.70 - TIRADO'S ESOPHAGUS 

WITHOUT DYSPLASIA   SNOMED Code(s): 424564148


   


Plan: 


1.  Discharge per medicine.  Diet as tolerated.  Return office in 2-3 weeks.  

Protonix 40 mg daily.





Assessment and plan a care discussed with Dr. Lopez

## 2018-04-13 NOTE — P.PN
Subjective


Progress Note Date: 04/13/18


Principal diagnosis: 


Acute blood loss anemia with coffee ground hematemesis and black tarry stools





Khoa is a 78-year-old white male patient of Dr. Boudreaux who presented to the 

emergency department on 04/10/2018 with 3 day history of black stools and 

coffee ground emesis.  In the emergency room room he also had some burgundy-

colored clots in the emesis upon arrival.  Denied any fevers, denied any 

chills.  Denied any lightheadedness or chest pain.  Denied any prior history of 

GI bleeding.  He drinks 2 beers on a regular basis, denies drinking any liquor.

  Acute abdominal series showed nonacute abdomen, and a stable chest.  Patient 

had a previous colonoscopy on 08/09/2016 by Dr. Werner, which showed sigmoid 

diverticulosis with no evidence of acute diverticulitis, strictures, polyps or 

cancer.  Other past medical history includes GERD/reflux, hyperlipidemia, 

hypertension, hypothyroidism, varicose veins for which patient follows with the 

vascular surgeon, nicotine dependence, which is currently in remission.  

Surgical history includes appendectomy, hernia repair, hemorrhoidectomy, 

cataract surgery on the right eye.  Initial lab work showed hemoglobin of 13.2 

on 04/10/2018, white count of 7.0, no evidence of coagulopathy, INR was 1.0, 

electrolytes were within normal limits, BUN was 30, creatinine is 1.0.  Liver 

enzymes, lipase were all within normal limits, cardiac enzymes and troponins 

were negative times one.  Serum alcohol was less than 10.  His labs hemoglobin 

is down to 11.1.  Patient has had no further episodes of hematemesis or black 

tarry stool since admission to the ICU.  Vital signs remain stable, he is 

hemodynamically stable.  He is awake alert, not requiring any supplemental 

oxygen.  He is afebrile.  Denies any distress, denies any chest pain or dyspnea

, denies any abdominal discomfort.  Patient remains nothing by mouth, and is 

awaiting to be evaluated by the GI service for possible EGD. 





On 04/12/2018 patient seen in follow-up in the intensive care unit.  He had one 

episode of dark stool since admission, it is hemoglobin is 10.4, no further 

episodes of hematemesis or coffee-ground emesis.  No leukocytosis, renal 

profile is within normal limits, electrolytes are within normal limits with 

exception of chloride which is at 110 on today's blood work.  Patient remains 

hemodynamically stable, he has not required any blood transfusions.  Denies any 

abdominal discomfort, denies any chest pain or dyspnea.  He is on room air, O2 

sat at 95%.  Maintenance IVs 0.9 at 125 ml/hr.  Patient is scheduled for EGD 

today.





On 04/13/2018 patient seen in follow-up on medical surgical floor.  He was 

transferred out of intensive care yesterday, has not had any recurrence of GI 

bleeding, status post EGD with biopsy on 04/12/2018.  Esophageal mass in 

segment of Colindres's esophagus with ulcerated surface was found that was not 

actively bleeding at the time of the exam.  Patient is sliding hiatal hernia, 

no other obvious abnormalities were noted in the stomach and duodenum.  Biopsy 

results are pending at this time.  Patient remains hemodynamically stable.  His 

hemoglobin is 11.0.  No hematemesis, no melena.  Denies any chest pain or 

dyspnea.  Currently on room air, O2 sat 96%.  Vital signs are stable, patient 

is afebrile, lung sounds are clear, no acute complaints.  From pulmonary/

critical care standpoint patient remains stable, we will sign off at this time, 

and see the patient on as-needed basis.








Objective





- Vital Signs


Vital signs: 


 Vital Signs











Temp  98.3 F   04/13/18 06:38


 


Pulse  76   04/13/18 06:38


 


Resp  18   04/13/18 06:38


 


BP  117/74   04/13/18 06:38


 


Pulse Ox  96   04/13/18 06:38








 Intake & Output











 04/12/18 04/13/18 04/13/18





 18:59 06:59 18:59


 


Intake Total 1100 900 


 


Output Total 1200  


 


Balance -100 900 


 


Intake:   


 


  IV 1100  


 


    Sodium Chloride 0.9% 1, 1000  





    000 ml @ 125 mls/hr IV .   





    Q8H [facility] Rx#:[id number]   


 


  Oral  900 


 


Output:   


 


  Urine 1200  


 


Other:   


 


  Voiding Method Urinal Toilet 





  Urinal 


 


  # Voids  2 














- Exam


GENERAL EXAM: Alert, pleasant, 78-year-old white male that appears younger than 

his stated age, comfortable in no apparent distress.


HEAD: Normocephalic/atraumatic.


EYES: Normal reaction of pupils, equal size.  Conjunctiva pink, sclera white.


NOSE: Clear with pink turbinates.


THROAT: No erythema or exudates.


NECK: No masses, no JVD, no thyroid enlargement, no adenopathy.


CHEST: No chest wall deformity.  Symmetrical expansion. 


LUNGS: Clear lung sounds, no rhonchi, no wheezes or rales noted on today's exam.


CVS: Regular rate and rhythm, normal S1 and S2, no gallops, no murmurs, no rubs


ABDOMEN: Soft, nontender.  No hepatosplenomegaly, normal bowel sounds, no 

guarding or rigidity.


EXTREMITIES: No clubbing, no edema, no cyanosis, 2+ pulses and upper and lower 

extremities.


MUSCULOSKELETAL: Muscle strength and tone normal.


SPINE: No scoliosis or deformity


SKIN: No rashes


CENTRAL NERVOUS SYSTEM: Alert and oriented -3.  No focal deficits, tone is 

normal in all 4 extremities.


PSYCHIATRIC: Alert and oriented -3.  Appropriate affect.  Intact judgment and 

insight.











- Labs


CBC & Chem 7: 


 04/13/18 07:45





 04/13/18 07:45


Labs: 


 Abnormal Lab Results - Last 24 Hours (Table)











  04/13/18 04/13/18 Range/Units





  07:45 07:45 


 


RBC   3.75 L  (4.30-5.90)  m/uL


 


Hgb   11.0 L  (13.0-17.5)  gm/dL


 


Hct   32.8 L  (39.0-53.0)  %


 


Lymphocytes #   0.9 L  (1.0-4.8)  k/uL


 


Chloride  109 H   ()  mmol/L














Assessment and Plan


Plan: 


Assessment:





#1.  Acute blood loss anemia with coffee ground hematemesis, and black tarry 

stools.  Hemoglobin went down from 13.2, down to 11.0





#2.  Chronic EtOH use, 2-3 beers on the daily basis, but no evidence of liver 

disease.





#3.  Dysphasia, has been treated with rounds of antibiotics per PCP.  limited 

oropharyngeal exam was negative for any evidence of exudates, redness, or 

swelling





#4.  History of diverticulosis as per colonoscopy from 08/09/2016





#5.  Hypertension, hyperlipidemia





#6.  GERD/reflux





#7.  Hypothyroidism





#8.  History of appendectomy, hernia repair, hemorrhoidectomy





#9.  Nicotine dependence, currently in remission





Plan:





EGD results have been reviewed, patient has not had any recurrence of GI 

bleeding.  Hemodynamically stable, has not required any blood transfusion this 

admission.  On room air, denies any chest pain, denies any shortness of breath 

or abdominal discomfort.  No acute events overnight.  From pulmonary/critical 

care standpoint patient remains stable, we'll sign off at this time and see the 

patient on as-needed basis.





I performed a history & physical examination of the patient and discussed their 

management with my nurse practitioner, Ariadna Foley.  I reviewed the nurse 

practitioner's note and agree with the documented findings and plan of care.  

Lung sounds are clear.  The findings and the impression was discussed with the 

patient.  I attest to the documentation by the nurse practitioner. 








Time with Patient: Less than 30

## 2018-04-18 ENCOUNTER — HOSPITAL ENCOUNTER (OUTPATIENT)
Dept: HOSPITAL 47 - LABWHC1 | Age: 78
Discharge: HOME | End: 2018-04-18
Attending: OTOLARYNGOLOGY
Payer: MEDICARE

## 2018-04-18 DIAGNOSIS — E03.9: ICD-10-CM

## 2018-04-18 DIAGNOSIS — E06.9: Primary | ICD-10-CM

## 2018-04-18 LAB — T4 FREE SERPL-MCNC: 1.03 NG/DL (ref 0.78–2.19)

## 2018-04-18 PROCEDURE — 86376 MICROSOMAL ANTIBODY EACH: CPT

## 2018-04-18 PROCEDURE — 84443 ASSAY THYROID STIM HORMONE: CPT

## 2018-04-18 PROCEDURE — 84439 ASSAY OF FREE THYROXINE: CPT

## 2018-04-18 PROCEDURE — 36415 COLL VENOUS BLD VENIPUNCTURE: CPT

## 2018-05-17 ENCOUNTER — HOSPITAL ENCOUNTER (OUTPATIENT)
Dept: HOSPITAL 47 - ORWHC2ENDO | Age: 78
Discharge: HOME | End: 2018-05-17
Payer: MEDICARE

## 2018-05-17 VITALS — HEART RATE: 82 BPM | SYSTOLIC BLOOD PRESSURE: 114 MMHG | DIASTOLIC BLOOD PRESSURE: 78 MMHG | RESPIRATION RATE: 18 BRPM

## 2018-05-17 VITALS — TEMPERATURE: 98.4 F

## 2018-05-17 VITALS — BODY MASS INDEX: 26.9 KG/M2

## 2018-05-17 DIAGNOSIS — Z79.899: ICD-10-CM

## 2018-05-17 DIAGNOSIS — K44.9: ICD-10-CM

## 2018-05-17 DIAGNOSIS — I10: ICD-10-CM

## 2018-05-17 DIAGNOSIS — K21.9: ICD-10-CM

## 2018-05-17 DIAGNOSIS — Z79.82: ICD-10-CM

## 2018-05-17 DIAGNOSIS — E78.5: ICD-10-CM

## 2018-05-17 DIAGNOSIS — C15.5: Primary | ICD-10-CM

## 2018-05-17 PROCEDURE — 88305 TISSUE EXAM BY PATHOLOGIST: CPT

## 2018-05-17 PROCEDURE — 43239 EGD BIOPSY SINGLE/MULTIPLE: CPT

## 2018-05-17 NOTE — P.PCN
Date of Procedure: 05/17/18


Procedure(s) Performed: 


Procedure: Esophagogastroduodenoscopy and biopsy.





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 

esophagus with biopsies negative for cancer.





Postoperative diagnosis: 1. 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 

with ulcerated surface but no active bleeding at the time of this exam as 

previously described.  2. Sliding hiatal hernia.  3. No obvious abnormalities 

in the stomach and duodenum.  4. Multiple biopsies obtained from the esophageal 

mass and a picture was taken to be included in the record.





Preparation and sedation: Was provided by anesthesia.





Brief clinical history: The patient is a 78-year-old male with a past medical 

history of GERD, hyperlipidemia, hypertension, daily beer consumption 2-3 beers 

a day was admitted last month with acute hematemesis and melena 2 days.  

Patient's had one moderate black colored bowel movement daily for 2 days prior 

to that admission, as well as a few episodes of maroon colored hematemesis the 

day before admission. Colonoscopy August 2016 screening for change in bowel 

habits identified sigmoid diverticulosis. He was taking a baby aspirin daily 

but no other antiplatelet or NSAID medications.  Denied weight loss, 

hematochezia, fever or chills. The patient underwent an upper endoscopy and an 

esophageal mass was identified in the esophagus in his segment of Colindres's 

esophagus.  The biopsies showed Colindres's esophagus without dysplasia or 

infiltrating cancer.  I scheduled this evaluation to obtain additional biopsies 

of that area because of suspicion of malignancy.  





Procedure: With the patient on his left lateral decubitus position and after 

informed consent and adequate sedation, I passed the Olympus- video 

upper endoscope through the cricopharyngeus down the esophagus.  GE junction 

was irregular and it started around 34 cm from the incisors and the tubular 

esophagus continues for another 4-5 cm.  The endoscope was then advanced 

through a hiatal hernia to the rest of the stomach which was insufflated with 

air and inspected in detail including the retroflex view in the cardia.  Finally

, the endoscope was passed through the pylorus into the duodenum.  Stomach, 

pyloric channel, duodenal bulb, post bulbar area and descending duodenum did 

not show any obvious abnormalities or bleeding.  The distal esophagus in the 

Colindres's segment showed an ulcerated mass measuring around 3 or 4 cm in 

greatest dimension.  There was friability but no spontaneous bleeding.  No 

other obvious abnormalities were seen in the esophagus or any evidence of 

active bleeding noted in this exam.  I obtained multiple biopsies from the 

esophageal mass as well as couple pictures then the endoscope was withdrawn.





The patient tolerated the procedure well.





Plan: The patient was briefed regarding the findings on this exam.  Will await 

biopsy results and make further plans.  I will keep you updated on his progress.

## 2018-07-14 ENCOUNTER — HOSPITAL ENCOUNTER (OUTPATIENT)
Dept: HOSPITAL 47 - RADPETMAIN | Age: 78
Discharge: HOME | End: 2018-07-14
Attending: INTERNAL MEDICINE
Payer: MEDICARE

## 2018-07-14 DIAGNOSIS — C15.9: Primary | ICD-10-CM

## 2018-07-14 PROCEDURE — 78815 PET IMAGE W/CT SKULL-THIGH: CPT

## 2018-07-14 NOTE — PE
EXAMINATION TYPE: PET CT fusion skull to thigh

 

DATE OF EXAM: 7/14/2018

 

COMPARISON: NONE

 

HISTORY: Esophageal cancer initial staging study per order though patient states this was diagnosed 2
 years ago. Recent right ear biopsy positive for malignancy one month ago per patient.   

 

TECHNIQUE:  Following the intravenous administration of 12.74 mCi of F-18 FDG, whole body images are 
performed from the top of skull to the midthigh.  Images are reviewed on the computer in the coronal,
 axial, and sagittal planes.  Reconstructed rotating images are created on independent workstation an
d reviewed on the computer.   A noncontrast CT is performed in conjunction with the PET scan.

 

SCAN: Initial Scan

 

FINDINGS: 

 

HEAD AND NECK:  No suspicious areas of abnormal hypermetabolic uptake are identified including evalua
tion of the scalp.

 

CHEST, MEDIASTINUM, AND HILAR REGION: There is focus of increased uptake in the distal esophagus appe
ars to correspond to eccentric soft tissue 1.6 cm round lesion involving posterior left aspect of the
 esophageal lumen on axial image 142, max SUV is 5.42. No suspicious adjacent adenopathy is identifie
d. No additional areas of abnormal hypermetabolic uptake are seen.

 

ABDOMEN AND PELVIS: No suspicious areas of abnormal hypermetabolic uptake are present. Normal excreti
on and bladder uptake is present.

 

OSSEOUS STRUCTURES: No suspicious hypermetabolic uptake is seen.

 

OTHER CT: Mild underlying emphysematous change is present with mild by basilar linear scarring and/or
 atelectasis.

 

There is moderate three-vessel coronary artery calcification which is noted marker for coronary arter
y disease.

 

Small degree of bilateral gynecomastia is identified.

 

Small hiatal hernia is present below level of neoplasm.

 

Diverticula are seen in the left and sigmoid colon. There is no CT evidence for acute diverticulitis.


 

There is mild calcified plaque of the distal abdominal aorta extending into pelvic branch vessels.

 

IMPRESSION: Abnormal hypermetabolic uptake distal esophagus likely corresponds to biopsy-proven neopl
asm. No metastatic disease is evident.

## 2018-10-20 ENCOUNTER — HOSPITAL ENCOUNTER (OUTPATIENT)
Dept: HOSPITAL 47 - RADPETMAIN | Age: 78
Discharge: HOME | End: 2018-10-20
Attending: INTERNAL MEDICINE
Payer: MEDICARE

## 2018-10-20 DIAGNOSIS — C15.5: Primary | ICD-10-CM

## 2018-10-20 DIAGNOSIS — K22.8: ICD-10-CM

## 2018-10-20 DIAGNOSIS — R94.8: ICD-10-CM

## 2018-10-20 PROCEDURE — 78815 PET IMAGE W/CT SKULL-THIGH: CPT

## 2018-10-21 NOTE — PE
EXAMINATION TYPE: PET CT fusion skull to thigh

 

DATE OF EXAM: 10/20/2018

 

CLINICAL HISTORY: 78 year-old male restaging esophageal cancer. Chemoradiation therapy 3 weeks ago.

 

TECHNIQUE:  Following the intravenous administration of  13.5 mCi of F-18 FDG, whole body images are 
performed from the skull base to the midthigh.  Images are reviewed on the computer in the coronal, a
xial, and sagittal planes.  Reconstructed rotating images are created on independent workstation and 
reviewed on the computer.   A localization and attenuation correction CT is performed in conjunction 
with the PET scan.

 

Glucose level: 86 mg/dL

CTDI: 4.66 mGy

DLP: 414.54 mGy-cm

 

COMPARISON: 7/14/2018

 

 

FINDINGS: 

 

PET:

Physiologic FDG uptake within the neck.

 

Residual mild thickening distal esophagus with a small hiatal hernia demonstrated. There is no signif
icant residual FDG uptake here versus intense uptake of 5.4, previously.

 

Average liver SUV 2.3.

 

Mild dotted activity on either side of the pelvis corresponds well to the ureters.

 

Focal mild uptake at the upper coccyx without any discrete CT abnormality, max SUV 2.1 (versus 1.8, p
reviously).

 

Otherwise, physiologic FDG uptake within the chest, abdomen and pelvis.

 

 

 

ATTENUATION CORRECTION CT:

Small mucosal retention cysts along the floors of the maxillary sinuses. Mastoid air cells well pneum
atized. Dental amalgam artifacts. No cervical lymphadenopathy.

 

The heart is upper limits of normal size of the pericardial effusion. Mild coronary vessel calcificat
ions.  Ectatic/borderline aneurysmal ascending aorta 4.0 cm. Conventional branching anatomy. No thora
cic lymphadenopathy. Mild diffuse bronchial wall thickening could represent chronic bronchitis or ast
hma. No consolidation or pleural effusion.

 

No dilated small bowel, free fluid, or free air. No mesenteric or retroperitoneal lymphadenopathy. Mo
derate stool burden. No pericolonic inflammatory change. Some surgical material at the cecum suggesti
ng prior appendectomy.

 

Bladder not distended. No abnormal fluid collection in the pelvis or pelvic lymphadenopathy.

 

Bones: No osseous destructive process.

 

 

 

IMPRESSION: 

 

1. Mild residual wall thickening distal esophagus but with complete clearance of the previous abnorma
l hypermetabolism. Findings compatible with treatment response.

2. Focal mild uptake along the upper coccyx without discrete CT abnormality. This was present to some
 extent on the prior study as well. Correlate for any history of tailbone injury.

3. No evidence for metastatic disease.

4. Incidental: Small hiatal hernia and borderline aneurysmal ascending aorta at 4.0 cm.

## 2019-01-28 ENCOUNTER — HOSPITAL ENCOUNTER (OUTPATIENT)
Dept: HOSPITAL 47 - RADCTMAIN | Age: 79
Discharge: HOME | End: 2019-01-28
Attending: RADIOLOGY
Payer: MEDICARE

## 2019-01-28 DIAGNOSIS — K22.8: Primary | ICD-10-CM

## 2019-01-28 DIAGNOSIS — K44.9: ICD-10-CM

## 2019-01-28 DIAGNOSIS — I71.2: ICD-10-CM

## 2019-01-28 DIAGNOSIS — C15.5: ICD-10-CM

## 2019-01-28 DIAGNOSIS — K57.30: ICD-10-CM

## 2019-01-28 DIAGNOSIS — I27.21: ICD-10-CM

## 2019-01-28 DIAGNOSIS — J43.9: ICD-10-CM

## 2019-01-28 PROCEDURE — 71250 CT THORAX DX C-: CPT

## 2019-01-28 PROCEDURE — 74176 CT ABD & PELVIS W/O CONTRAST: CPT

## 2019-01-28 NOTE — CT
EXAMINATION TYPE: CT ChestAbdPelvis wo con

 

DATE OF EXAM: 1/28/2019

 

COMPARISON: PET/CT 10/20/2018

 

HISTORY: 78-year-old male follow up study for known esophageal CA.

 

TECHNIQUE: Contiguous axial scanning of the chest, abdomen, and pelvis without IV contrast. Coronal a
nd sagittal reconstructions performed.

 

CT DLP: 1047 mGycm

Automated exposure control for dose reduction was used.

 

 

FINDINGS: 

Chest:

Heart normal sinus without pericardial effusion. Coronary vessel calcifications are present.

 

Stable borderline aneurysm ascending aorta at 4.0 cm. Mild atherosclerotic arch calcifications with c
onventional arch vessel branching anatomy.

 

Mildly enlarged caliber to the main right and left pulmonary arteries measuring 2.6 and 2.7 cm, respe
ctively.

 

No thoracic lymphadenopathy by CT size criteria.

 

Evaluation of the lung shows mild upper lung emphysematous change. No suspicious pulmonary nodules. N
o pleural effusions.

 

Mild cerebral ventral wall thickening of the distal esophagus is similar to prior PET/CT. Small hiata
l hernia just below. Just adjacent along the left aorta esophageal junction, there is some new soft t
issue or fluid thickening measuring 2.6 cm, axial image 53.

 

 

 

ABDOMEN:

Noncontrast appearance of the liver, gallbladder, adrenal glands, kidneys, spleen, and atrophic pancr
eas show no gross abnormality.

 

Note limitation in assessment of solid abdominal viscera, lymph nodes, and vascular structures due to
 lack of IV contrast.

 

No dilated small bowel, free fluid, or free air. No mesenteric or retroperitoneal lymphadenopathy.

 

Some surgical material at the cecum suggesting prior appendectomy. Oral contrast progressed to the he
patic flexure. Moderate stool burden. Sigmoid diverticulosis.

 

 

Pelvis:

Bladder urine distended. No abnormal fluid collection in the pelvis or pelvic lymphadenopathy seen.

 

 

Bones:

Mild degenerative changes of the hips. Facet arthropathy lower lumbar spine. No osseous destructive p
rocess seen.

 

 

 

IMPRESSION: 

 

1. SIMILAR MILD CIRCUMFERENTIAL WALL THICKENING OF THE DISTAL ESOPHAGUS AT THE SITE OF PATIENT'S ALEJANDRA
ZEYNEP NEOPLASM. ADJACENT SMALL HIATAL HERNIA REDEMONSTRATED. 

 

2. ALSO JUST ADJACENT ALONG THE LEFT AORTOESOPHAGEAL JUNCTION, THERE IS SOME NEW SOFT TISSUE THICKENI
NG OR FLUID MEASURING 2.6 CM. FINDINGS MAY REPRESENT POSTTREATMENT CHANGE RELATING TO THE RADIATION P
ORT. CLOSE FOLLOW-UP RECOMMENDED TO ENSURE STABILITY OR RESOLUTION.

 

3. INCIDENTAL: COPD WITH MILD EMPHYSEMA, PULMONARY ARTERIAL HYPERTENSION, STABLE BORDERLINE ANEURYSM 
ASCENDING AORTA AT 4.0 CM, AND SIGMOID DIVERTICULOSIS.

## 2019-02-09 ENCOUNTER — HOSPITAL ENCOUNTER (OUTPATIENT)
Dept: HOSPITAL 47 - RADPETMAIN | Age: 79
Discharge: HOME | End: 2019-02-09
Attending: RADIOLOGY
Payer: MEDICARE

## 2019-02-09 DIAGNOSIS — Z92.21: ICD-10-CM

## 2019-02-09 DIAGNOSIS — I71.2: ICD-10-CM

## 2019-02-09 DIAGNOSIS — C15.5: Primary | ICD-10-CM

## 2019-02-09 PROCEDURE — 78815 PET IMAGE W/CT SKULL-THIGH: CPT

## 2019-02-11 NOTE — PE
EXAMINATION TYPE: PET CT fusion skull to thigh

 

DATE OF EXAM: 2/9/2019

 

COMPARISON: CT chest abdomen pelvis 1/28/2019

                            Prior PET/CT: 10/20/2018, 7/14/2018

 

HISTORY: Malignant esophageal cancer   

 

TECHNIQUE:  Following the intravenous administration of 10.27 mCi of F-18 FDG, whole body images are 
performed from the skull base to the midthigh.  Images are reviewed on the computer in the coronal, a
xial, and sagittal planes.  Reconstructed rotating images are created on independent workstation and 
reviewed on the computer.   A localization and attenuation correction CT is performed in conjunction 
with the PET scan.

 

DLP: 410.90 mGycm

 

SCAN: Subsequent

 

Blood glucose: 85 mg/dL

 

Average Mediastinum SUV: 1.11

Average Liver SUV: 1.98

 

FINDINGS: 

NECK:  There is mild increased uptake within the left C6-C7 facet may be degenerative in nature. Susp
icious uptake to suggest metastatic disease is not identified.

 

THORAX: No suspicious uptake

 

ABDOMEN: No suspicious uptake

 

PELVIS: No suspicious uptake

 

OSSEOUS STRUCTURES: No suspicious uptake

 

LOCALIZATION CT: Ascending thoracic aorta at the level the main pulmonary artery is 4.1 cm. The main 
pulmonary artery the bifurcation is 2.9 cm. There may be a gastric pull-through. Suspicious thickenin
g is not identified.

 

COMPARISON: No significant change from recent CT chest abdomen pelvis. No suspicious changes from the
 comparison PET/CT of October 2018..

 

IMPRESSION:

1. No suspicious uptake to suggest recurrent or metastatic esophageal cancer.

2. Ascending thoracic aortic aneurysm 4.1 cm.

## 2019-03-05 ENCOUNTER — HOSPITAL ENCOUNTER (OUTPATIENT)
Dept: HOSPITAL 47 - ORWHC2ENDO | Age: 79
Discharge: HOME | End: 2019-03-05
Payer: MEDICARE

## 2019-03-05 VITALS — TEMPERATURE: 98.2 F | RESPIRATION RATE: 16 BRPM

## 2019-03-05 VITALS — HEART RATE: 71 BPM | SYSTOLIC BLOOD PRESSURE: 121 MMHG | DIASTOLIC BLOOD PRESSURE: 81 MMHG

## 2019-03-05 VITALS — BODY MASS INDEX: 26.9 KG/M2

## 2019-03-05 DIAGNOSIS — J44.9: ICD-10-CM

## 2019-03-05 DIAGNOSIS — Z85.01: ICD-10-CM

## 2019-03-05 DIAGNOSIS — E78.5: ICD-10-CM

## 2019-03-05 DIAGNOSIS — Z79.890: ICD-10-CM

## 2019-03-05 DIAGNOSIS — Z79.82: ICD-10-CM

## 2019-03-05 DIAGNOSIS — K21.9: ICD-10-CM

## 2019-03-05 DIAGNOSIS — K22.70: Primary | ICD-10-CM

## 2019-03-05 DIAGNOSIS — Z92.21: ICD-10-CM

## 2019-03-05 PROCEDURE — 88305 TISSUE EXAM BY PATHOLOGIST: CPT

## 2019-03-05 PROCEDURE — 43239 EGD BIOPSY SINGLE/MULTIPLE: CPT

## 2019-03-05 NOTE — P.PCN
Date of Procedure: 03/05/19


Procedure(s) Performed: 


Procedure: Esophagogastroduodenoscopy and biopsy.





Preoperative diagnosis: History of esophageal cancer.





Postoperative diagnosis: 1. Ulcerated area in the segment of Colindres's 

esophagus corresponding to the esophageal mass previously treated.  2. Multiple 

biopsies obtained.





Preparation and sedation: Was provided by anesthesia.





Brief clinical history: The patient is a 78-year-old male with history of 

Colindres's esophagus and esophageal mass that showed on biopsy invasive 

moderately differentiated adenocarcinoma back in May 2018.  The patient 

completed treatment with radiation and chemotherapy towards the end of 2018 and 

apparently he declined surgical intervention.  This evaluation is for follow-

up.  He reports feeling fine without any difficulties swallowing.





Procedure: With the patient on his left lateral decubitus position and after 

informed consent and adequate sedation, I passed the Olympus-GIF H190 video 

upper endoscope through the cricopharyngeus down the esophagus.  GE junction 

was irregular as previously described and started around 34 cm from the 

incisors and the tubular esophagus continued for another 5 cm or so.  There was 

a hiatal hernia as previously described.  In the area corresponding to the 

ulcerated mass in the Colindres's segment, there was a residual longitudinal 

ulceration measuring around 3 cm covered with white exudate with prominent 

friable folds at its edges.  I took couple pictures and obtained multiple 

biopsies.  The endoscope was then passed into the stomach which was insufflated 

with air and inspected in detail including the retroflex view in the cardia.  

Finally, the endoscope was passed through the pylorus into the duodenum.  No 

obvious abnormalities in the stomach or duodenum and no biopsies were obtained.





The patient tolerated the procedure well.





Plan: I summarized the findings to the patient.  Will await pathology results 

and make further plans.  He will follow up with you as planned.

## 2019-06-10 ENCOUNTER — HOSPITAL ENCOUNTER (OUTPATIENT)
Dept: HOSPITAL 47 - RADCTMAIN | Age: 79
Discharge: HOME | End: 2019-06-10
Attending: INTERNAL MEDICINE
Payer: MEDICARE

## 2019-06-10 DIAGNOSIS — K22.8: ICD-10-CM

## 2019-06-10 DIAGNOSIS — J44.9: Primary | ICD-10-CM

## 2019-06-10 DIAGNOSIS — C15.5: ICD-10-CM

## 2019-06-10 LAB — BUN SERPL-SCNC: 18 MG/DL (ref 9–20)

## 2019-06-10 PROCEDURE — 84520 ASSAY OF UREA NITROGEN: CPT

## 2019-06-10 PROCEDURE — 71260 CT THORAX DX C+: CPT

## 2019-06-10 PROCEDURE — 82565 ASSAY OF CREATININE: CPT

## 2019-06-10 PROCEDURE — 74177 CT ABD & PELVIS W/CONTRAST: CPT

## 2019-06-10 PROCEDURE — 36415 COLL VENOUS BLD VENIPUNCTURE: CPT

## 2019-06-10 NOTE — CT
EXAMINATION TYPE: CT ChestAbdPelvis w con

 

DATE OF EXAM: 6/10/2019

 

COMPARISON: 2/9/2019 and 10/20/2018

 

HISTORY: 79-year-old male follow-up Esophageal cancer

 

TECHNIQUE: Contiguous axial scanning of the chest, abdomen, and pelvis performed with IV Contrast, pa
tient injected with 100 mL of Isovue 300. Delayed images through the kidneys were obtained. Coronal/s
agittal reconstructions performed.

 

CT DLP: 752.7 mGycm

Automated exposure control for dose reduction was used.

 

FINDINGS: 

 

CHEST:

The heart is normal size without pericardial effusion. Coronary vessel calcifications are present.

 

Ectatic ascending aorta currently measured at 3.8 cm. Conventional arch vessel branching anatomy.

 

No thoracic lymphadenopathy.

 

Evaluation of the lungs shows mild centrilobular emphysema without consolidation or pleural effusion.
 Some strandy atelectasis or scarring at the medial lung bases.

 

 

 

ABDOMEN:

This is stable circumferential thickening distal esophagus.

 

Liver, gallbladder, adrenal glands, kidneys, spleen, and pancreas appear within normal limits.

 

Portal venous system is patent. No biliary ductal dilatation.

 

No dilated small bowel, free fluid, or free air. Moderate stool burden. Some surgical material in the
 right lower quadrant suggesting prior appendectomy. No pericolonic inflammatory change.

 

No mesenteric or retroperitoneal lymphadenopathy.

 

 

Pelvis:

Bladder is urine distended. Prostate gland measures 4.6 and is wide. No abnormal fluid collection in 
the pelvis or pelvic lymphadenopathy.

 

 

Bones:

Mild degenerative changes at the hips. Facet arthropathy lower lumbar spine. Endplate spondylosis mid
 to lower thoracic spine. No osseous destructive process.

 

 

IMPRESSION: 

 

1. STABLE MILD CIRCUMFERENTIAL WALL THICKENING DISTAL ESOPHAGUS SUGGESTING SITE OF TREATED DISEASE.

2. NO SUSPICIOUS MASS OR LYMPHADENOPATHY TO SUGGEST METASTATIC DISEASE.

3. COPD WITH MILD EMPHYSEMA.

## 2019-09-06 ENCOUNTER — HOSPITAL ENCOUNTER (OUTPATIENT)
Dept: HOSPITAL 47 - RADCTMAIN | Age: 79
Discharge: HOME | End: 2019-09-06
Attending: INTERNAL MEDICINE
Payer: MEDICARE

## 2019-09-06 DIAGNOSIS — R91.8: ICD-10-CM

## 2019-09-06 DIAGNOSIS — K44.9: ICD-10-CM

## 2019-09-06 DIAGNOSIS — C15.5: Primary | ICD-10-CM

## 2019-09-06 LAB — BUN SERPL-SCNC: 20 MG/DL (ref 9–20)

## 2019-09-06 PROCEDURE — 84520 ASSAY OF UREA NITROGEN: CPT

## 2019-09-06 PROCEDURE — 82565 ASSAY OF CREATININE: CPT

## 2019-09-06 PROCEDURE — 74177 CT ABD & PELVIS W/CONTRAST: CPT

## 2019-09-06 PROCEDURE — 71260 CT THORAX DX C+: CPT

## 2019-09-06 PROCEDURE — 36415 COLL VENOUS BLD VENIPUNCTURE: CPT

## 2019-09-06 NOTE — CT
EXAMINATION TYPE: CT ChestAbdPelvis w con

 

DATE OF EXAM: 9/6/2019

 

COMPARISON: 6/10/2019 and 2/9/2019

 

HISTORY: 79-year-old male Esophageal cancer

 

TECHNIQUE: Contiguous axial scanning of the chest, abdomen, and pelvis performed with IV Contrast, pa
tient injected with 80 mL of Isovue 300. Delayed images through the kidneys were obtained. Coronal/sa
gittal reconstructions performed.

 

CT DLP: 1472 mGycm

Automated exposure control for dose reduction was used.

 

FINDINGS: 

 

CHEST:

Heart normal size without pericardial effusion. Mild coronary vessel calcifications are present.

 

Ectatic ascending aorta at 3.8 cm redemonstrated. Conventional arch vessel branching anatomy.

 

Borderline enlarged caliber to the main right and left pulmonary arteries are 2.6 and 2.5 cm, respect
ively, suggesting underlying pulmonary arterial hypertension.

 

No thoracic lymphadenopathy by CT size criteria.

 

Mild centrilobular emphysema redemonstrated.

 

New focal spiculated subpleural densities anterior left upper lobe measuring up to 2.9 x 1.3 cm.

New spiculated densities medial basilar right lower lobe measuring 2.5 x 1.3 cm and 1.4 x 1.2 cm.

New irregular patchy subpleural density posterior left base measuring 3.5 x 1.6 cm.

 

Tiny 3 mm peripheral right basilar pulmonary nodule axial image 50 was present in retrospect.

 

No pleural effusion.

 

 

 

ABDOMEN:

Small hiatal hernia. Some residual muscular frontal thickening distal esophagus suggesting site of tr
eated disease.

 

Liver, gallbladder, adrenal glands, kidneys, spleen, and pancreas appear within normal limits. Portal
 venous system is patent. No biliary ductal dilatation.

 

No dilated small bowel, free fluid, or free air. Moderate stool burden. Sigmoid diverticulosis. No pe
ricolonic inflammatory change. Surgical clip right lower quadrant may relate to prior appendectomy.

 

No mesenteric or retroperitoneal lymphadenopathy seen.

 

 

Pelvis:

Prostate gland measures 4.2 cm wide. Mild circumferential bladder wall thickening may relate to cysti
tis or chronic bladder wall hypertrophy.. No abnormal fluid collection in the pelvis or pelvic lympha
denopathy.

 

 

Bones:

Mild degenerative changes at the hips. Mild facet arthropathy lower lumbar spine. No osseous destruct
roge process.

 

 

 

 

IMPRESSION: 

 

1. STABLE RESIDUAL MILD WALL THICKENING DISTAL ESOPHAGUS LIKELY RELATING TO SITE OF TREATED DISEASE. 
THERE IS AN ADJACENT SMALL HIATAL HERNIA.

 

2. NEW IRREGULAR FOCAL DENSITIES, ONE WITHIN THE LEFT UPPER LOBE (2.9 CM), 2 WITHIN THE RIGHT LOWER L
OBE (2.5 AND 1.4 CM), AND ONE WITHIN THE LEFT BASE (3.5 CM). MULTIPLE INFECTIOUS/INFLAMMATORY FOCI AR
E SUGGESTED GIVEN THE RAPID DEVELOPMENT IN 3 MONTHS. METASTATIC DISEASE IS CONSIDERED LESS LIKELY. CL
OSE FOLLOW-UP AFTER ANY POTENTIAL TREATMENT IS RECOMMENDED.

 

3. NO SUSPICIOUS MASS OR LYMPHADENOPATHY TO SUGGEST METASTATIC DISEASE ELSEWHERE.

## 2019-10-24 ENCOUNTER — HOSPITAL ENCOUNTER (OUTPATIENT)
Dept: HOSPITAL 47 - RADCTMAIN | Age: 79
Discharge: HOME | End: 2019-10-24
Attending: INTERNAL MEDICINE
Payer: MEDICARE

## 2019-10-24 DIAGNOSIS — I77.810: ICD-10-CM

## 2019-10-24 DIAGNOSIS — C15.5: Primary | ICD-10-CM

## 2019-10-24 DIAGNOSIS — I71.2: ICD-10-CM

## 2019-10-24 LAB — BUN SERPL-SCNC: 18 MG/DL (ref 9–20)

## 2019-10-24 PROCEDURE — 36415 COLL VENOUS BLD VENIPUNCTURE: CPT

## 2019-10-24 PROCEDURE — 82565 ASSAY OF CREATININE: CPT

## 2019-10-24 PROCEDURE — 84520 ASSAY OF UREA NITROGEN: CPT

## 2019-10-24 PROCEDURE — 71260 CT THORAX DX C+: CPT

## 2019-10-24 NOTE — CT
EXAMINATION TYPE: CT chest w con

 

DATE OF EXAM: 10/24/2019

 

COMPARISON: 9/6/2019

 

HISTORY: Esophageal CA

 

CT DLP: 465 mGycm, Automated exposure control for dose reduction was used.

 

CONTRAST: Performed injected with 50 mL of Isovue 300.

 

TECHNIQUE: Axial images were obtained at 5 mm thick sections.  Reconstructed images are reviewed on [employer] computer in the coronal plane. 

 

FINDINGS: Portion of the thyroid visualized is normal.

 

No suspicious lung nodules or focal infiltrates are present. Some mild streak opacities in the lingul
a which may be related atelectasis. 

 

At the posterior right lung base small soft tissue density which has some spiculation on metformin do
se. This area measures 0.9 x 2.5 cm. Series 4 image 50. This area is smaller than comparison. The are
a of increased density within the posterior lateral left lung base, likewise, is smaller than the com
parison. Streak opacity within the region of the mid left anterior lung currently measures 0.8 x 2.9 
cm which is slightly smaller than comparison.

 

Tiny nodule in the periphery of the right lung is less apparent than comparison

 

No enlarged mediastinal or hilar adenopathy is evident.   The ascending aorta diameter at the level o
f the main pulmonary artery is 1.0 cm.  The main pulmonary artery diameter at the bifurcation is 2.4 
cm. The aorta tapers to the distal course.

 

Limited CT sections are obtained through the upper abdomen. 

 

There is eccentric thickening of the distal esophagus which could be related to the patient's esophag
eal cancer. Diffuse thickening of the midesophagus may be present below level of the marilyn.

 

IMPRESSIONS:

1. Diminished size of lung window densities. Findings could be related to metastatic disease or infla
mmatory changes.

2. Diffuse thickening of the mid to distal esophagus with some eccentric thickening of the distal eso
phagus near the gastroesophageal junction. Findings could be related to the patient's esophageal canc
er.

3. Aneurysmal dilatation measuring 4.0 cm of the ascending thoracic aorta.

## 2020-01-06 ENCOUNTER — HOSPITAL ENCOUNTER (OUTPATIENT)
Dept: HOSPITAL 47 - RADCTMAIN | Age: 80
Discharge: HOME | End: 2020-01-06
Attending: RADIOLOGY
Payer: MEDICARE

## 2020-01-06 DIAGNOSIS — Z92.21: ICD-10-CM

## 2020-01-06 DIAGNOSIS — C15.5: ICD-10-CM

## 2020-01-06 DIAGNOSIS — K22.70: ICD-10-CM

## 2020-01-06 DIAGNOSIS — J43.9: Primary | ICD-10-CM

## 2020-01-06 LAB — BUN SERPL-SCNC: 17 MG/DL (ref 9–20)

## 2020-01-06 PROCEDURE — 82565 ASSAY OF CREATININE: CPT

## 2020-01-06 PROCEDURE — 36415 COLL VENOUS BLD VENIPUNCTURE: CPT

## 2020-01-06 PROCEDURE — 74170 CT ABD WO CNTRST FLWD CNTRST: CPT

## 2020-01-06 PROCEDURE — 84520 ASSAY OF UREA NITROGEN: CPT

## 2020-01-06 PROCEDURE — 71270 CT THORAX DX C-/C+: CPT

## 2020-01-06 NOTE — CT
EXAMINATION TYPE: CT chest abdomen wo/w con

 

DATE OF EXAM: 1/6/2020

 

COMPARISON: 10/24/2019

 

HISTORY: 79-year-old male Malignant neoplasm of lower third of esophagus.

 

TECHNIQUE: Contiguous axial scanning of the chest and abdomen before and after administration of 100 
ml Isovue 300 IV contrast.  Delayed images through the kidneys and coronal/sagittal reconstructions p
erformed.

 

CT DLP: 1178.3 mGycm

Automated exposure control for dose reduction was used.

 

 

FINDINGS:

 

CHEST:

Heart normal size without pericardial effusion. Mild LAD calcifications are present.

 

Borderline ectasia ascending aorta 3.5 cm. Mild arch calcifications with conventional branching anato
my.

 

Trace bilateral gynecomastia.

 

Moderate circumferential wall thickening of the lower 4 cm of the thoracic esophagus.

 

No thoracic lymphadenopathy by CT size criteria.

 

Strandy atelectasis within the lingula and both posterior lung bases.

 

3 mm peripheral right basilar pulmonary nodule, axial image 49. Mild centrilobular emphysema and mild
 bronchial wall thickening. No consolidation or pleural effusion. Curvilinear density remains at the 
posterior right base at the site of previous thickening. Similar findings along the anterior left mid
lung. No residual nodularity is seen at these 2 sites.

 

 

ABDOMEN:

No focal liver lesion or biliary ductal dilatation. Portal venous system is patent.

 

Gallbladder, adrenal glands, kidneys, spleen, and pancreas appear within normal limits.

 

No dilated small bowel, free fluid, or free air.

 

Moderate colonic stool. No pericolonic inflammatory change. Some surgical material right lower quadra
nt likely relating to prior appendectomy.

 

No mesenteric or retroperitoneal lymphadenopathy.

 

Pelvis is not imaged.

 

 

BONES:

Mild facet arthropathy lower lumbar spine. Mild degenerative disc disease mid thoracic spine. No osse
ous destructive process.

 

 

IMPRESSION: 

 

1. MODERATE CIRCUMFERENTIAL WALL THICKENING OF THE LOWER 4 CM OF THE THORACIC ESOPHAGUS CORRESPONDING
 TO THE SITE OF PATIENT'S KNOWN NEOPLASM. RESIDUAL DISEASE OR POSTTREATMENT CHANGE ARE SUGGESTED. NO 
PROGRESSIVE THICKENING FROM 10/24/2019.

2. TINY 3 MM PERIPHERAL RIGHT BASILAR PULMONARY NODULE IS UNCHANGED. SOME CURVILINEAR THICKENING POST
ERIOR RIGHT BASE IS IMPROVED FROM 10/24/2019 AND COULD REPRESENT RESIDUAL SCARRING. SUSPECT ADDITIONA
L RESIDUAL SCARRING ANTERIOR LEFT MIDLUNG.

3. OTHERWISE, NO FINDINGS TO SUGGEST METASTATIC DISEASE IN THE CHEST OR ABDOMEN.

4. COPD WITH MILD EMPHYSEMA. MODERATE STOOL BURDEN.

## 2020-02-24 ENCOUNTER — HOSPITAL ENCOUNTER (OUTPATIENT)
Dept: HOSPITAL 47 - ORWHC2ENDO | Age: 80
Discharge: HOME | End: 2020-02-24
Attending: INTERNAL MEDICINE
Payer: MEDICARE

## 2020-02-24 VITALS — RESPIRATION RATE: 18 BRPM | HEART RATE: 69 BPM | DIASTOLIC BLOOD PRESSURE: 88 MMHG | SYSTOLIC BLOOD PRESSURE: 166 MMHG

## 2020-02-24 VITALS — BODY MASS INDEX: 26.2 KG/M2

## 2020-02-24 DIAGNOSIS — Z90.49: ICD-10-CM

## 2020-02-24 DIAGNOSIS — Z92.21: ICD-10-CM

## 2020-02-24 DIAGNOSIS — K21.9: ICD-10-CM

## 2020-02-24 DIAGNOSIS — Z98.890: ICD-10-CM

## 2020-02-24 DIAGNOSIS — Z79.82: ICD-10-CM

## 2020-02-24 DIAGNOSIS — Z98.49: ICD-10-CM

## 2020-02-24 DIAGNOSIS — Z97.2: ICD-10-CM

## 2020-02-24 DIAGNOSIS — K44.9: ICD-10-CM

## 2020-02-24 DIAGNOSIS — K22.70: ICD-10-CM

## 2020-02-24 DIAGNOSIS — E07.9: ICD-10-CM

## 2020-02-24 DIAGNOSIS — Z79.890: ICD-10-CM

## 2020-02-24 DIAGNOSIS — E78.5: ICD-10-CM

## 2020-02-24 DIAGNOSIS — Z87.891: ICD-10-CM

## 2020-02-24 DIAGNOSIS — Z92.3: ICD-10-CM

## 2020-02-24 DIAGNOSIS — C7A.1: Primary | ICD-10-CM

## 2020-02-24 PROCEDURE — 88305 TISSUE EXAM BY PATHOLOGIST: CPT

## 2020-02-24 PROCEDURE — 43239 EGD BIOPSY SINGLE/MULTIPLE: CPT

## 2020-02-24 PROCEDURE — 88341 IMHCHEM/IMCYTCHM EA ADD ANTB: CPT

## 2020-02-24 PROCEDURE — 88342 IMHCHEM/IMCYTCHM 1ST ANTB: CPT

## 2020-02-24 NOTE — P.PCN
Date of Procedure: 02/24/20


Description of Procedure: 





BRIEF HISTORY: 


Patient is a 79-year-old male with a prior history of Colindres's esophagus and 

esophageal mass that showed invasive moderately differentiated adenocarcinoma in

May 2018.  The patient completed treatment with radiation and chemotherapy but 

declined surgical intervention.  He was seen in follow-up in 03/2019 at which 

time he underwent EGD significant for ulcerated area the segment of Colindres's 

esophagus corresponding to esophageal mass previously treated with multiple 

biopsies obtained and significant only for ulcerated squamo glandular mucosa 

with inflammation, fibrosis and reactive changes which was negative for 

malignancy.





PROCEDURE PERFORMED: 


Esophagogastroduodenoscopy with biopsy.





PREOPERATIVE DIAGNOSIS: 


Esophageal cancer. 





ESTIMATED BLOOD LOSS: 


Minimal.





IV sedation per anesthesia. 





PROCEDURE: 


After informed consent was obtained, the patient  was brought into the endoscopy

unit. IV sedation was administered by Anesthesia under continuous monitoring. 

Initially the Olympus GIF-190 video endoscope was inserted into the mouth. 

Esophagus intubated without any difficulty. It was gradually advanced into the 

stomach and duodenum and carefully examined. The bulb and the second part of the

duodenum appeared normal. The scope at this time was withdrawn to the stomach, 

adequately insufflated with air, and upon careful examination, mucosa of the 

antrum, body, cardia and the fundus appeared normal. The scope was then withdra

wn into the esophagus. The GE junction was located at 40 cm from the incisors 

with a 2 cm hiatal hernia noted.  Ulcerated fibrosed tissue noted in this 

segment of Colindres's esophagus starting at 35 cm from the incisors to the GE 

junction at 40 cm from the incisors in the area of previous esophageal cancer.  

Multiple biopsies were taken of this region.  Scope was withdrawn and the 

patient tolerated the procedure well.. 





IMPRESSION: 


1.  Ulcerated, fibrosed area in the distal esophagus in the area of prior 

esophageal mass/adenocarcinoma which was previously treated, this was vigorously

biopsied.


2.  Small hiatal hernia..





RECOMMENDATIONS: 


The findings of this examination were discussed with the patient in his family. 

Okay to resume diet.  Okay to resume medications.  Await pathology from 

biopsies.  Follow up with oncology as previously scheduled.

## 2020-04-17 ENCOUNTER — HOSPITAL ENCOUNTER (OUTPATIENT)
Dept: HOSPITAL 47 - RADPETMAIN | Age: 80
Discharge: HOME | End: 2020-04-17
Attending: INTERNAL MEDICINE
Payer: MEDICARE

## 2020-04-17 DIAGNOSIS — C15.5: Primary | ICD-10-CM

## 2020-04-17 DIAGNOSIS — R91.1: ICD-10-CM

## 2020-04-17 PROCEDURE — 78815 PET IMAGE W/CT SKULL-THIGH: CPT

## 2020-04-20 NOTE — PE
Nuclear medicine PET/CT

 

HISTORY: Esophageal carcinoma, subsequent

 

Patient received 12.9 mCi F-18 FDG intravenously in delayed scanning was performed from the skull bas
e to the mid thighs. Localization and attenuation correction CT scan was performed.

 

Correlation to prior nuclear medicine PET/CT 2/9/2019, CT chest 1/6/2020

 

Neck and chest: The abnormal activity seen in the distal thoracic esophagus with circumferential wall
 thickening is again noted and shows an interval increase in activity as compared to prior exam, ther
e is hypermetabolic uptake present SUV 9.7. No mediastinal, axillary, or hilar adenopathy. There is a
 new nodule, axial image #100 with some associated mild uptake present measuring approximately 14 mm 
in size, SUV only 1.7 in the right lung.

 

ABDOMEN: There is no evident liver mass or retroperitoneal adenopathy. No ascites or suspicious hyper
metabolic uptake.

 

Osseous structures are stable.

 

IMPRESSION: There is abnormal uptake and thickening of the distal esophagus with new pulmonary nodule
 with mild uptake.

## 2020-07-02 ENCOUNTER — HOSPITAL ENCOUNTER (OUTPATIENT)
Dept: HOSPITAL 47 - RADCTMAIN | Age: 80
Discharge: HOME | End: 2020-07-02
Attending: INTERNAL MEDICINE
Payer: MEDICARE

## 2020-07-02 DIAGNOSIS — R91.1: ICD-10-CM

## 2020-07-02 DIAGNOSIS — K44.9: ICD-10-CM

## 2020-07-02 DIAGNOSIS — C15.5: ICD-10-CM

## 2020-07-02 DIAGNOSIS — K22.8: Primary | ICD-10-CM

## 2020-07-02 LAB — BUN SERPL-SCNC: 16 MG/DL (ref 9–20)

## 2020-07-02 PROCEDURE — 82565 ASSAY OF CREATININE: CPT

## 2020-07-02 PROCEDURE — 36415 COLL VENOUS BLD VENIPUNCTURE: CPT

## 2020-07-02 PROCEDURE — 71260 CT THORAX DX C+: CPT

## 2020-07-02 PROCEDURE — 74177 CT ABD & PELVIS W/CONTRAST: CPT

## 2020-07-02 PROCEDURE — 84520 ASSAY OF UREA NITROGEN: CPT

## 2020-07-02 NOTE — CT
EXAMINATION TYPE: CT ChestAbdPelvis w con

 

DATE OF EXAM: 7/2/2020

 

COMPARISON: 1/6/2020

 

HISTORY: FOLLOW UP FOR ESOPHAGEAL CA

 

CT DLP: 803.4 mGycm

 

CONTRAST: 

CT scan of the chest, abdomen and pelvis is performed with Oral Contrast and with IV Contrast, patien
t injected with 100 mL of Isovue 300.

 

CT  Chest:

LUNGS: The lungs are clear and free of infiltrate or atelectasis. There is a new pulmonary nodule dakota
suring 1.2 cm within the right upper lobe image 38. 3 mm pleural-based nodule left lower lobe image 3
0. Right basilar atelectasis and/or parenchymal scar. No pleural effusion or CT evidence of interstit
ial lung disease.

 

MEDIASTINUM:  Distal esophageal wall thickening and a hiatal hernia. Thoracic aorta is of normal greg
jordan.  The heart is not enlarged.  No evidence for mediastinal mass or adenopathy.

 

HILAR STRUCTURES: No evidence for mass.  No hilar adenopathy is appreciated.

 

OTHER: No significant abnormality.

 

CONTRAST CT ABDOMEN AND PELVIS FINDINGS: 

 

LIVER/GB:   No calcified gallstones.  No space occupying hepatic lesion. Biliary tree is of normal ca
liber. 

 

PANCREAS:  No inflammation.  No distinct mass. 

 

SPLEEN:  No splenic enlargement.  No lesion seen. 

 

ADRENALS:  No nodule.  No thickening. 

 

KIDNEYS/BLADDER:  No hydronephrosis.  No nephrolithiasis.  No disctinct renal mass. 

 

BOWEL: Normal appendix.  Normal bowel caliber.  No inflammation. 

 

GENITAL ORGANS:  No gross abnormality. 

 

LYMPH NODES:  No greater than 1cm abdominal or pelvic lymph nodes are appreciated.

 

AORTA: No significant abnormality. 

 

OSSEOUS STRUCTURES:  No significant abnormality is seen. 

 

OTHER:  No significant additional abnormality is seen.  

 

IMPRESSION: 

1. Persistent thickening of the distal esophagus and associated hiatal hernia. Findings are compatibl
e with the provided history.

2. New 1.2 cm pulmonary nodule right upper lobe suspicious for metastatic lesion. There is also a new
 3 mm pleural-based nodule as discussed.

## 2020-07-06 ENCOUNTER — HOSPITAL ENCOUNTER (OUTPATIENT)
Dept: HOSPITAL 47 - ORWHC2ENDO | Age: 80
Discharge: HOME | End: 2020-07-06
Attending: INTERNAL MEDICINE
Payer: MEDICARE

## 2020-07-06 VITALS — BODY MASS INDEX: 26 KG/M2

## 2020-07-06 VITALS — SYSTOLIC BLOOD PRESSURE: 102 MMHG | DIASTOLIC BLOOD PRESSURE: 62 MMHG | HEART RATE: 78 BPM

## 2020-07-06 VITALS — TEMPERATURE: 97.4 F

## 2020-07-06 VITALS — RESPIRATION RATE: 16 BRPM

## 2020-07-06 DIAGNOSIS — Z87.19: ICD-10-CM

## 2020-07-06 DIAGNOSIS — E07.9: ICD-10-CM

## 2020-07-06 DIAGNOSIS — Z92.3: ICD-10-CM

## 2020-07-06 DIAGNOSIS — Z97.2: ICD-10-CM

## 2020-07-06 DIAGNOSIS — Z98.49: ICD-10-CM

## 2020-07-06 DIAGNOSIS — C15.9: Primary | ICD-10-CM

## 2020-07-06 DIAGNOSIS — Z79.890: ICD-10-CM

## 2020-07-06 DIAGNOSIS — E78.5: ICD-10-CM

## 2020-07-06 DIAGNOSIS — K44.9: ICD-10-CM

## 2020-07-06 DIAGNOSIS — Z92.21: ICD-10-CM

## 2020-07-06 DIAGNOSIS — Z90.49: ICD-10-CM

## 2020-07-06 DIAGNOSIS — Z85.828: ICD-10-CM

## 2020-07-06 DIAGNOSIS — Z98.890: ICD-10-CM

## 2020-07-06 DIAGNOSIS — Z87.891: ICD-10-CM

## 2020-07-06 PROCEDURE — 88305 TISSUE EXAM BY PATHOLOGIST: CPT

## 2020-07-06 PROCEDURE — 43239 EGD BIOPSY SINGLE/MULTIPLE: CPT

## 2020-07-06 NOTE — P.PCN
Date of Procedure: 07/06/20


Description of Procedure: 





BRIEF HISTORY: 


Patient is a 80-year-old male with a prior history of Colindres's esophagus and 

esophageal mass that showed invasive moderately differentiated adenocarcinoma in

May 2018.  The patient completed treatment with radiation and chemotherapy but 

declined surgical intervention.  He was seen in follow-up in 03/2019 at which 

time he underwent EGD significant for ulcerated area the segment of Colindres's 

esophagus corresponding to esophageal mass previously treated with multiple 

biopsies obtained and significant only for ulcerated squamo glandular mucosa 

with inflammation, fibrosis and reactive changes which was negative for 

malignancy.  However repeat EGD at the beginning of 2020 with biopsies of the 

same ulcerated, fibrosed area in the distal esophagus was significant for 

recurrence of the malignancy.  Subsequently he is undergone treatment with 

chemotherapy with the oncology service and presents back for assessment.





PROCEDURE PERFORMED: 


Esophagogastroduodenoscopy with biopsy.





PREOPERATIVE DIAGNOSIS: 


Esophageal cancer. 





ESTIMATED BLOOD LOSS: 


Minimal.





IV sedation per anesthesia. 





PROCEDURE: 


After informed consent was obtained, the patient  was brought into the endoscopy

unit. IV sedation was administered by Anesthesia under continuous monitoring. 

Initially the Olympus GIF-190 video endoscope was inserted into the mouth. 

Esophagus intubated without any difficulty. It was gradually advanced into the 

stomach and duodenum and carefully examined. The bulb and the second part of the

duodenum appeared normal. The scope at this time was withdrawn to the stomach, 

adequately insufflated with air, and upon careful examination, mucosa of the 

antrum, body, cardia and the fundus appeared normal. The scope was then 

withdrawn into the esophagus. The GE junction was located at 40 cm from the 

incisors with a 2 cm hiatal hernia noted.  Ulcerated, fibrosed and friable 

tissue noted to extend from 35 cm from the incisors to the GE junction at 40 cm 

from the incisors in the area of prior biopsies which were positive for 

recurrence of the patient's malignancy.  Biopsies were again taken.  The 

remaining esophagus appeared normal.  The patient tolerated the procedure well.





IMPRESSION: 


1.  Esophageal cancer.


3.  5 cm segment of ulcerated, fibrosed area in the distal esophagus in the area

of esophageal cancer which appears similar to last EGD, however tissue was 

extremely friable compared to prior scope.  Biopsies were taken.


2.  Small hiatal hernia.





RECOMMENDATIONS: 


The findings of this examination were discussed with the patient in his 

girlfriend.  Okay to resume soft diet.  Okay to resume medications.  Await 

pathology from biopsies.  Patient is scheduled to follow up with Dr. Bertrand of the 

oncology service.  If patient begins to have progressive esophageal dysphagia 

and PEG tube is required consideration would be for a surgically placed feeding 

tube versus referral to tertiary center for IR guided feeding tube.

## 2020-08-05 ENCOUNTER — HOSPITAL ENCOUNTER (EMERGENCY)
Dept: HOSPITAL 47 - EC | Age: 80
Discharge: HOME | End: 2020-08-05
Payer: MEDICARE

## 2020-08-05 VITALS — HEART RATE: 79 BPM | DIASTOLIC BLOOD PRESSURE: 66 MMHG | SYSTOLIC BLOOD PRESSURE: 97 MMHG | TEMPERATURE: 97.7 F

## 2020-08-05 VITALS — RESPIRATION RATE: 18 BRPM

## 2020-08-05 DIAGNOSIS — Z92.21: ICD-10-CM

## 2020-08-05 DIAGNOSIS — Z85.01: ICD-10-CM

## 2020-08-05 DIAGNOSIS — K21.9: ICD-10-CM

## 2020-08-05 DIAGNOSIS — Z92.3: ICD-10-CM

## 2020-08-05 DIAGNOSIS — C15.9: Primary | ICD-10-CM

## 2020-08-05 DIAGNOSIS — Z79.890: ICD-10-CM

## 2020-08-05 DIAGNOSIS — Z85.828: ICD-10-CM

## 2020-08-05 DIAGNOSIS — E07.9: ICD-10-CM

## 2020-08-05 DIAGNOSIS — Z79.899: ICD-10-CM

## 2020-08-05 PROCEDURE — 70360 X-RAY EXAM OF NECK: CPT

## 2020-08-05 PROCEDURE — 99284 EMERGENCY DEPT VISIT MOD MDM: CPT

## 2020-08-05 PROCEDURE — 71046 X-RAY EXAM CHEST 2 VIEWS: CPT

## 2020-08-05 NOTE — XR
EXAMINATION TYPE: XR soft tissue neck

 

DATE OF EXAM: 8/5/2020

 

COMPARISON: NONE

 

HISTORY: Dysphasia. History of esophageal cancer.

 

TECHNIQUE: 2 view soft tissue neck.

 

FINDINGS: No suspicious prevertebral soft tissue swelling. No suspicious narrowing of the subglottic 
airway on the frontal view. Oral pharyngeal and hypopharyngeal airways are patent.

 

IMPRESSION: As above.

## 2020-08-05 NOTE — XR
EXAMINATION TYPE: XR chest 2V

 

DATE OF EXAM: 8/5/2020

 

COMPARISON: CT July 2, 2020 and older CTs. Prior chest x-ray October 27, 2015

 

HISTORY: Dysphasia. History of esophageal cancer.

 

TECHNIQUE:  Frontal and lateral views of the chest are obtained.

 

FINDINGS:  There is chronic parenchymal change without suspicious new

focal air space opacity, pleural effusion, or pneumothorax seen. Findings greatest in the lingula octavia
r the level of treatment for known esophageal cancer. Stable just over 1 cm right middle lobe nodule 
is concerning for metastatic disease and new from older CTs and requires nonemergent follow-up. The c
ardiac silhouette size remains within normal limits with atherosclerotic change in the aortic knob. M
ultilevel spurring in the spine.

 

IMPRESSION:  Chronic changes without new acute pulmonary process.

## 2020-08-05 NOTE — ED
ENT HPI





- General


Chief complaint: ENT


Stated complaint: post chemo sensation in throat


Time Seen by Provider: 08/05/20 10:38


Source: patient, RN notes reviewed, old records reviewed


Mode of arrival: ambulatory


Limitations: no limitations





- History of Present Illness


Initial comments: 





Khoa is an 80-year-old male with a known history of esophageal cancer.  He 

presents emergency department today with progressive dysphagia and occasional 

regurgitation with eating soft foods after he had an upper GI scope done by Dr. Serrato on July 6.  Patient states that it seems like food gets caught in the 

upper portion of his throat and esophagus.  He states that he is able to 

tolerate liquids and tries to keep his calories up by drinking ensure in juices.

 He states he has no difficulty with this.  He states that eating some soft 

foods becomes more difficult.  He states he has had some normal stools despite 

not eating much and states that he has been urinating well.  He states is 

currently receiving no treatment for the esophageal cancer and reportedly was 

told he has 3-6 months to live.  He states that he has no significant pain at 

this time. 





- Related Data


                                Home Medications











 Medication  Instructions  Recorded  Confirmed


 


Levothyroxine Sodium [Synthroid] 25 mcg PO DAILY 08/05/16 08/05/20


 


Omeprazole 20 mg PO BID 08/05/20 08/05/20











                                    Allergies











Allergy/AdvReac Type Severity Reaction Status Date / Time


 


No Known Allergies Allergy   Verified 08/05/20 11:31














Review of Systems


ROS Statement: 


Those systems with pertinent positive or pertinent negative responses have been 

documented in the HPI.





ROS Other: All systems not noted in ROS Statement are negative.





Past Medical History


Past Medical History: Cancer, GERD/Reflux, GI Bleed, Hyperlipidemia, Thyroid 

Disorder


Additional Past Medical History / Comment(s): skin cancer, passed out without 

known cause 2018, esophageal cancer 2020 with radiation and chemotherapy .


History of Any Multi-Drug Resistant Organisms: None Reported


Past Surgical History: Appendectomy, Hernia Repair


Additional Past Surgical History / Comment(s): Hemorrhoidectomy.  UMB HERNIAS. 

LASER RT LEG VEINS, cataract surg., EGD


Past Anesthesia/Blood Transfusion Reactions: No Reported Reaction


Additional Past Anesthesia/Blood Transfusion Reaction / Comment(s): STATES THEY 

USE A SMALL SCOPE.


Past Psychological History: No Psychological Hx Reported


Past Alcohol Use History: Occasional


Past Drug Use History: None Reported





- Past Family History


  ** Mother


Family Medical History: Unable to Obtain


Additional Family Medical History / Comment(s): pt. adopted





General Exam





- General Exam Comments


Initial Comments: 





80-year-old male.  Alert and oriented.  No distress.


Limitations: no limitations


General appearance: alert, in no apparent distress


Head exam: Present: atraumatic, normocephalic, normal inspection


Eye exam: Present: normal appearance, PERRL, EOMI.  Absent: scleral icterus, 

conjunctival injection, periorbital swelling


ENT exam: Present: normal exam, mucous membranes moist


Neck exam: Present: normal inspection.  Absent: tenderness, meningismus, 

lymphadenopathy


Respiratory exam: Present: normal lung sounds bilaterally.  Absent: respiratory 

distress, wheezes, rales, rhonchi, stridor


Cardiovascular Exam: Present: regular rate, normal rhythm, normal heart sounds. 

 Absent: systolic murmur, diastolic murmur, rubs, gallop, clicks


GI/Abdominal exam: Present: soft, normal bowel sounds.  Absent: distended, 

tenderness, guarding, rebound, rigid


Extremities exam: Present: normal inspection, full ROM, normal capillary refill.

  Absent: tenderness, pedal edema, joint swelling, calf tenderness


Back exam: Present: normal inspection


Neurological exam: Present: alert, oriented X3, CN II-XII intact


Psychiatric exam: Present: normal affect, normal mood


Skin exam: Present: warm, dry, intact, normal color.  Absent: rash





Course


                                   Vital Signs











  08/05/20 08/05/20





  10:31 12:12


 


Temperature 97.9 F 97.7 F


 


Pulse Rate 91 79


 


Respiratory 18 18





Rate  


 


Blood Pressure 128/81 97/66


 


O2 Sat by Pulse 97 98





Oximetry  














Medical Decision Making





- Medical Decision Making





80-year-old male presents with 1 month of feeling that something is stuck in his

 throat after having an EGD.  He has known history of esophageal cancer.  He 

states that he is able to tolerate liquids but occasionally will have a 

difficult time swallowing softer foods and has had some episodes of 

regurgitation.  Patient denies any change in urinary habits and still reports 

that he is maintaining his hydration with drinking liquids only.  Patient states

 that he was told he has 3-6 months to live.  He states he wants no further 

testing at this time to ensure that there is nothing blocking his airway.  He is

 breathing well.  He is able to tolerate drinking water and emergency department

 with no vomiting.  Lungs are clear to auscultation.  I did offer the Patient 

further treatment and states he prefers just go home at this time.  I stated.  

It developed difficulty with swallowing liquids and unable to keep those down 

Patient will need to return to the ER for possible PEG tube placement.





- Radiology Data


Radiology results: report reviewed


No suspicious prevertebral soft tissue swelling.  No suspicious tearing of the 

subglottic airway on frontal view.  Oral and pharyngeal and hypopharyngeal 

airways are patent. 





Chest x-ray shows chronic changes without a new acute pulmonary process.  

Findings greatest recently getting her level treatment for known for esophageal 

cancer.  Stable just over 1 cm right middle lobe nodules concerning for 

metastatic disease and due from older CTs and requires nonemergent follow-up.  

The cardiac silhouette size remains normal.  Multilevel spurring and spine.





Disposition


Clinical Impression: 


 Esophageal mass, Dysphagia





Disposition: HOME SELF-CARE


Condition: Good


Instructions (If sedation given, give patient instructions):  Dysphagia (ED)


Additional Instructions: 


 Please return to ED if  severe difficulty with swallowing any liquids or you 

start to vomit more please return to the ER for reevaluation.  If that will be 

the case, he may need to have a PEG tube placed to supplement your  intake.  

Return to emergency department if any alarming signs or symptoms occur. Follow 

up with PCP. 


Is patient prescribed a controlled substance at d/c from ED?: No


Referrals: 


Zenia Boudreaux MD [Primary Care Provider] - 1-2 days


Time of Disposition: 11:51

## 2020-08-20 ENCOUNTER — HOSPITAL ENCOUNTER (OUTPATIENT)
Dept: HOSPITAL 47 - RADCTMAIN | Age: 80
Discharge: HOME | End: 2020-08-20
Attending: INTERNAL MEDICINE
Payer: MEDICARE

## 2020-08-20 DIAGNOSIS — K57.30: ICD-10-CM

## 2020-08-20 DIAGNOSIS — Z03.89: Primary | ICD-10-CM

## 2020-08-20 DIAGNOSIS — R91.8: ICD-10-CM

## 2020-08-20 DIAGNOSIS — I25.10: ICD-10-CM

## 2020-08-20 DIAGNOSIS — C7A.1: ICD-10-CM

## 2020-08-20 DIAGNOSIS — J43.9: ICD-10-CM

## 2020-08-20 LAB — BUN SERPL-SCNC: 20 MG/DL (ref 9–20)

## 2020-08-20 PROCEDURE — 36415 COLL VENOUS BLD VENIPUNCTURE: CPT

## 2020-08-20 PROCEDURE — 82565 ASSAY OF CREATININE: CPT

## 2020-08-20 PROCEDURE — 74177 CT ABD & PELVIS W/CONTRAST: CPT

## 2020-08-20 PROCEDURE — 71260 CT THORAX DX C+: CPT

## 2020-08-20 PROCEDURE — 70491 CT SOFT TISSUE NECK W/DYE: CPT

## 2020-08-20 PROCEDURE — 84520 ASSAY OF UREA NITROGEN: CPT

## 2020-08-20 NOTE — CT
EXAMINATION TYPE: CT ChestAbdPelvis w con

 

DATE OF EXAM: 8/20/2020

 

COMPARISON: 7/2/2020, 4/17/2020, 9/6/2019

 

HISTORY: 80-year-old male Esophageal cancer, observe for METS (endocrine tumors)

 

TECHNIQUE: Contiguous axial scanning of the chest, abdomen, and pelvis performed with IV Contrast, pa
tient injected with 50 mL of Isovue 300. Delayed images through the kidneys were obtained. Coronal/sa
gittal reconstructions performed.

 

CT DLP: 924.80 mGycm

Automated exposure control for dose reduction was used.

 

FINDINGS: 

 

CHEST:

Heart normal size without pericardial effusion. Focal LAD calcifications are noted.

 

No gross lymphadenopathy by CT size criteria.

 

Upper ascending thoracic aorta is ectatic at 3.7 cm. Conventional arch vessel branching anatomy.

 

Prominent fluid mild to moderately distending the thoracic esophagus.

 

There may be a greater degree of wall thickening of the distal third thoracic esophagus causing a rel
ative obstruction to the passage of ingested material. Refer to sagittal image 59 for a good represen
tative image.

 

The lobulated right mid lung nodule shows increasing size currently measuring 1.6 x 1.1 cm versus 1.2
 x 0.6 cm, previously.

 

There is mild centrilobular emphysema. Strandy atelectasis or scarring at the posterior right base.

 

New bilobed 8 x 5 mm right middle lobe pulmonary nodule, axial image 38.

 

 

 

ABDOMEN:

No focal liver lesion seen or biliary ductal dilatation. Portal venous system is patent.

 

Gallbladder, adrenal glands, kidneys, spleen, and pancreas show no gross abnormality.

 

No dilated small bowel, free fluid, or free air.

 

Some surgical material at the right lower quadrant. Scattered mild to moderate stool. Sigmoid diverti
culosis. No pericolonic inflammatory change.

 

No mesenteric or retroperitoneal lymphadenopathy seen.

 

 

PELVIS:

Some excreted contrast is noted layering within the nondistended bladder. No abnormal fluid collectio
n in the pelvis or pelvic lymphadenopathy.

 

 

BONES:

Degenerative changes at the hips and lower lumbar spine. Endplate spondylosis midthoracic spine. No o
sseous destructive process.

 

 

 

IMPRESSION: 

 

1. INCREASING SOFT TISSUE THICKENING ALONG THE DISTAL THIRD ESOPHAGUS AT THE SITE OF PATIENT'S KNOWN 
ESOPHAGEAL CARCINOMA. THERE IS NOW A FLUID COLUMN WITHIN THE MORE PROXIMAL ESOPHAGUS SUGGESTING THE D
EVELOPMENT OF A RELATIVE OBSTRUCTION TO THE PASSAGE OF INGESTED MATERIAL. LOCAL RECURRENCE SHOULD BE 
CONSIDERED.

 

2. INCREASING SIZE OF THE RIGHT MID LUNG PULMONARY NODULE NOW AT 1.6 X 1.1 CM (VERSUS 1.2 X 0.6 CM, P
REVIOUSLY). A BILOBED RIGHT MIDDLE LOBE PULMONARY NODULE IS NEW AT 8 X 5 MM. BOTH ARE SUSPICIOUS FOR 
METASTATIC DISEASE.

 

3. INCIDENTAL: COPD WITH MILD EMPHYSEMA. FOCAL LAD CALCIFICATIONS. SIGMOID DIVERTICULOSIS.

## 2020-08-20 NOTE — CT
EXAMINATION TYPE: CT soft tissue neck w con

 

DATE OF EXAM: 8/20/2020

 

COMPARISON: 10/29/2015

 

HISTORY: 80-year-old male Esophageal cancer (endocrine tumors), observe for METS

 

TECHNIQUE: Contiguous axial scanning of the soft tissues of the neck performed with IV Contrast, johanna
ent injected with 50 mL of Isovue 300. Coronal/sagittal reconstructions performed.

 

CT DLP: 306.8 mGycm

Automated exposure control for dose reduction was used.

 

FINDINGS: 

Fluid within the thoracic esophagus with mild esophageal distention.

 

Thyroid gland appears slightly small and atrophic. Moderate atherosclerotic calcifications left carot
id bifurcation.

 

Parotid and salivary glands appear satisfactory.

 

Visualized intracranial structures, orbits and globes, and mastoid air cells appear clear. Mild lobul
ated mucosal thickening floors of the maxillary sinuses.

 

The nasopharynx and oropharynx appears clear.

 

Nonspecific small focus of calcification in the prevertebral soft tissues at the C1-C2 junction. No r
etropharyngeal effusion. Calcification is likely chronic and of questionable clinical significance.

 

Epiglottis within normal limits.

 

Oropharynx is clear.

The glottic and subglottic airway appears patent.

 

No cervical lymphadenopathy is seen.

 

No osseous destructive process identified.

 

 

IMPRESSION: 

 

1. FLUID MILDLY DISTENDING THE THORACIC ESOPHAGUS. CORRELATE FOR A RELATIVE DISTAL ESOPHAGEAL OBSTRUC
TION.

2. NO SUSPICIOUS CERVICAL LYMPHADENOPATHY OR NECK MASS IDENTIFIED.

## 2020-09-01 ENCOUNTER — HOSPITAL ENCOUNTER (EMERGENCY)
Dept: HOSPITAL 47 - EC | Age: 80
Discharge: OTHER INSTITUTION | End: 2020-09-01
Payer: MEDICARE

## 2020-09-01 VITALS
RESPIRATION RATE: 16 BRPM | TEMPERATURE: 97 F | HEART RATE: 69 BPM | DIASTOLIC BLOOD PRESSURE: 68 MMHG | SYSTOLIC BLOOD PRESSURE: 122 MMHG

## 2020-09-01 DIAGNOSIS — K22.70: Primary | ICD-10-CM

## 2020-09-01 DIAGNOSIS — Z87.891: ICD-10-CM

## 2020-09-01 DIAGNOSIS — K22.9: ICD-10-CM

## 2020-09-01 DIAGNOSIS — Z85.828: ICD-10-CM

## 2020-09-01 LAB
ALBUMIN SERPL-MCNC: 4.8 G/DL (ref 3.5–5)
ALP SERPL-CCNC: 80 U/L (ref 38–126)
ALT SERPL-CCNC: 46 U/L (ref 4–49)
ANION GAP SERPL CALC-SCNC: 14 MMOL/L
APTT BLD: 22.7 SEC (ref 22–30)
AST SERPL-CCNC: 47 U/L (ref 17–59)
BASOPHILS # BLD AUTO: 0 K/UL (ref 0–0.2)
BASOPHILS NFR BLD AUTO: 1 %
BILIRUB UR QL STRIP.AUTO: (no result)
BUN SERPL-SCNC: 28 MG/DL (ref 9–20)
CALCIUM SPEC-MCNC: 10.1 MG/DL (ref 8.4–10.2)
CHLORIDE SERPL-SCNC: 107 MMOL/L (ref 98–107)
CK SERPL-CCNC: 93 U/L (ref 55–170)
CO2 SERPL-SCNC: 24 MMOL/L (ref 22–30)
EOSINOPHIL # BLD AUTO: 0 K/UL (ref 0–0.7)
EOSINOPHIL NFR BLD AUTO: 1 %
ERYTHROCYTE [DISTWIDTH] IN BLOOD BY AUTOMATED COUNT: 5 M/UL (ref 4.3–5.9)
ERYTHROCYTE [DISTWIDTH] IN BLOOD: 14.4 % (ref 11.5–15.5)
GLUCOSE SERPL-MCNC: 90 MG/DL (ref 74–99)
HCT VFR BLD AUTO: 45.9 % (ref 39–53)
HGB BLD-MCNC: 14.7 GM/DL (ref 13–17.5)
HYALINE CASTS UR QL AUTO: 39 /LPF (ref 0–2)
INR PPP: 0.9 (ref ?–1.2)
KETONES UR QL STRIP.AUTO: (no result)
LYMPHOCYTES # SPEC AUTO: 1 K/UL (ref 1–4.8)
LYMPHOCYTES NFR SPEC AUTO: 13 %
MAGNESIUM SPEC-SCNC: 2.3 MG/DL (ref 1.6–2.3)
MCH RBC QN AUTO: 29.3 PG (ref 25–35)
MCHC RBC AUTO-ENTMCNC: 31.9 G/DL (ref 31–37)
MCV RBC AUTO: 91.9 FL (ref 80–100)
MONOCYTES # BLD AUTO: 0.4 K/UL (ref 0–1)
MONOCYTES NFR BLD AUTO: 5 %
NEUTROPHILS # BLD AUTO: 5.7 K/UL (ref 1.3–7.7)
NEUTROPHILS NFR BLD AUTO: 79 %
PH UR: 5.5 [PH] (ref 5–8)
PLATELET # BLD AUTO: 188 K/UL (ref 150–450)
POTASSIUM SERPL-SCNC: 4.2 MMOL/L (ref 3.5–5.1)
PROT SERPL-MCNC: 7.7 G/DL (ref 6.3–8.2)
PROT UR QL: (no result)
PT BLD: 9.9 SEC (ref 9–12)
RBC UR QL: 4 /HPF (ref 0–5)
SODIUM SERPL-SCNC: 145 MMOL/L (ref 137–145)
SP GR UR: 1.03 (ref 1–1.03)
SQUAMOUS UR QL AUTO: 1 /HPF (ref 0–4)
UROBILINOGEN UR QL STRIP: 4 MG/DL (ref ?–2)
WBC # BLD AUTO: 7.2 K/UL (ref 3.8–10.6)
WBC # UR AUTO: 3 /HPF (ref 0–5)

## 2020-09-01 PROCEDURE — 85025 COMPLETE CBC W/AUTO DIFF WBC: CPT

## 2020-09-01 PROCEDURE — 96360 HYDRATION IV INFUSION INIT: CPT

## 2020-09-01 PROCEDURE — 80053 COMPREHEN METABOLIC PANEL: CPT

## 2020-09-01 PROCEDURE — 81001 URINALYSIS AUTO W/SCOPE: CPT

## 2020-09-01 PROCEDURE — 96361 HYDRATE IV INFUSION ADD-ON: CPT

## 2020-09-01 PROCEDURE — 83735 ASSAY OF MAGNESIUM: CPT

## 2020-09-01 PROCEDURE — 83605 ASSAY OF LACTIC ACID: CPT

## 2020-09-01 PROCEDURE — 84484 ASSAY OF TROPONIN QUANT: CPT

## 2020-09-01 PROCEDURE — 82550 ASSAY OF CK (CPK): CPT

## 2020-09-01 PROCEDURE — 84100 ASSAY OF PHOSPHORUS: CPT

## 2020-09-01 PROCEDURE — 85730 THROMBOPLASTIN TIME PARTIAL: CPT

## 2020-09-01 PROCEDURE — 93005 ELECTROCARDIOGRAM TRACING: CPT

## 2020-09-01 PROCEDURE — 85610 PROTHROMBIN TIME: CPT

## 2020-09-01 PROCEDURE — 99285 EMERGENCY DEPT VISIT HI MDM: CPT

## 2020-09-01 PROCEDURE — 36415 COLL VENOUS BLD VENIPUNCTURE: CPT

## 2020-09-01 NOTE — ED
Weakness HPI





- General


Chief complaint: Weakness


Stated complaint: Dehydrated,sent by dr


Time Seen by Provider: 09/01/20 15:00


Source: patient, RN notes reviewed, old records reviewed


Mode of arrival: ambulatory


Limitations: no limitations





- History of Present Illness


Initial comments: 





This is an 80-year-old male DF presents today for evaluation regards to 

inability to eat or drink significant weight loss.  Patient has history of 

esophageal cancer and is unable to eat or drink currently.  Patient today 

patient will history or sick contacts no pain patient was seen by his oncologist

and sent ER for further evaluation management


MD Complaint: generalized weakness, lack of energy


-: days(s)


Location: generalized


Severity: severe


Severity scale (1-10): 8


Consistency: constant


Improves with: none


Worsens with: other


Context: history of similar


Associated Symptoms: denies other symptoms





- Related Data


                                Home Medications











 Medication  Instructions  Recorded  Confirmed


 


No Known Home Medications  09/01/20 09/01/20











                                    Allergies











Allergy/AdvReac Type Severity Reaction Status Date / Time


 


No Known Allergies Allergy   Verified 09/01/20 16:03














Review of Systems


ROS Statement: 


Those systems with pertinent positive or pertinent negative responses have been 

documented in the HPI.





ROS Other: All systems not noted in ROS Statement are negative.





Past Medical History


Past Medical History: Cancer, GERD/Reflux, GI Bleed, Hyperlipidemia, Thyroid 

Disorder


Additional Past Medical History / Comment(s): skin cancer, passed out without 

known cause 2018, esophageal cancer 2020 with radiation and chemotherapy .


History of Any Multi-Drug Resistant Organisms: None Reported


Past Surgical History: Appendectomy, Hernia Repair


Additional Past Surgical History / Comment(s): Hemorrhoidectomy.  UMB HERNIAS. 

LASER RT LEG VEINS, cataract surg., EGD


Past Anesthesia/Blood Transfusion Reactions: No Reported Reaction


Additional Past Anesthesia/Blood Transfusion Reaction / Comment(s): STATES THEY 

USE A SMALL SCOPE.


Past Psychological History: No Psychological Hx Reported


Smoking Status: Former smoker


Past Alcohol Use History: Occasional


Past Drug Use History: None Reported





- Past Family History


  ** Mother


Family Medical History: Unable to Obtain


Additional Family Medical History / Comment(s): pt. adopted





General Exam


Limitations: no limitations


General appearance: alert, in no apparent distress


Head exam: Present: atraumatic, normocephalic, normal inspection


Eye exam: Present: normal appearance, PERRL, EOMI.  Absent: scleral icterus, 

conjunctival injection, periorbital swelling


ENT exam: Present: normal exam, mucous membranes moist


Neck exam: Present: normal inspection.  Absent: tenderness, meningismus, 

lymphadenopathy


Respiratory exam: Present: normal lung sounds bilaterally.  Absent: respiratory 

distress, wheezes, rales, rhonchi, stridor


Cardiovascular Exam: Present: regular rate, normal rhythm, normal heart sounds. 

Absent: systolic murmur, diastolic murmur, rubs, gallop, clicks


GI/Abdominal exam: Present: soft, normal bowel sounds.  Absent: distended, 

tenderness, guarding, rebound, rigid


Extremities exam: Present: normal inspection, full ROM, normal capillary refill.

 Absent: tenderness, pedal edema, joint swelling, calf tenderness


Back exam: Present: normal inspection


Neurological exam: Present: alert, oriented X3, CN II-XII intact


Psychiatric exam: Present: normal affect, normal mood


Skin exam: Present: warm, dry, intact, normal color.  Absent: rash





Course


                                   Vital Signs











  09/01/20





  14:53


 


Temperature 97.8 F


 


Pulse Rate 106 H


 


Respiratory 20





Rate 


 


Blood Pressure 102/68


 


O2 Sat by Pulse 96





Oximetry 














- Reevaluation(s)


Reevaluation #1: 





09/01/20 16:03


Medical records reviewed


Reevaluation #2: 





09/01/20 16:03


Patient feeling better with hydration





- Consultations


Consultation #1: 





Spoke with Dr. Jerome regarding transferred to [facility]





EKG Findings





- EKG Comments:


EKG Findings:: EKG sinus rhythm 89  QRS 94 





Medical Decision Making





- Medical Decision Making





80 male to be transferred to [facility] for evaluation of altered less than 

eating, patient is unable to eat or drink





- Lab Data


Result diagrams: 


                                 09/01/20 15:18





                                 09/01/20 15:18


                                   Lab Results











  09/01/20 09/01/20 09/01/20 Range/Units





  15:18 15:18 15:18 


 


WBC  7.2    (3.8-10.6)  k/uL


 


RBC  5.00    (4.30-5.90)  m/uL


 


Hgb  14.7    (13.0-17.5)  gm/dL


 


Hct  45.9    (39.0-53.0)  %


 


MCV  91.9    (80.0-100.0)  fL


 


MCH  29.3    (25.0-35.0)  pg


 


MCHC  31.9    (31.0-37.0)  g/dL


 


RDW  14.4    (11.5-15.5)  %


 


Plt Count  188    (150-450)  k/uL


 


Neutrophils %  79    %


 


Lymphocytes %  13    %


 


Monocytes %  5    %


 


Eosinophils %  1    %


 


Basophils %  1    %


 


Neutrophils #  5.7    (1.3-7.7)  k/uL


 


Lymphocytes #  1.0    (1.0-4.8)  k/uL


 


Monocytes #  0.4    (0-1.0)  k/uL


 


Eosinophils #  0.0    (0-0.7)  k/uL


 


Basophils #  0.0    (0-0.2)  k/uL


 


PT   9.9   (9.0-12.0)  sec


 


INR   0.9   (<1.2)  


 


APTT   22.7   (22.0-30.0)  sec


 


Sodium    145  (137-145)  mmol/L


 


Potassium    4.2  (3.5-5.1)  mmol/L


 


Chloride    107  ()  mmol/L


 


Carbon Dioxide    24  (22-30)  mmol/L


 


Anion Gap    14  mmol/L


 


BUN    28 H  (9-20)  mg/dL


 


Creatinine    1.32 H  (0.66-1.25)  mg/dL


 


Est GFR (CKD-EPI)AfAm    59  (>60 ml/min/1.73 sqM)  


 


Est GFR (CKD-EPI)NonAf    51  (>60 ml/min/1.73 sqM)  


 


Glucose    90  (74-99)  mg/dL


 


Plasma Lactic Acid Gildardo     (0.7-2.0)  mmol/L


 


Calcium    10.1  (8.4-10.2)  mg/dL


 


Phosphorus    4.6 H  (2.5-4.5)  mg/dL


 


Magnesium    2.3  (1.6-2.3)  mg/dL


 


Total Bilirubin    2.3 H  (0.2-1.3)  mg/dL


 


AST    47  (17-59)  U/L


 


ALT    46  (4-49)  U/L


 


Alkaline Phosphatase    80  ()  U/L


 


Creatine Kinase    93  ()  U/L


 


Troponin I     (0.000-0.034)  ng/mL


 


Total Protein    7.7  (6.3-8.2)  g/dL


 


Albumin    4.8  (3.5-5.0)  g/dL














  09/01/20 09/01/20 Range/Units





  15:18 15:18 


 


WBC    (3.8-10.6)  k/uL


 


RBC    (4.30-5.90)  m/uL


 


Hgb    (13.0-17.5)  gm/dL


 


Hct    (39.0-53.0)  %


 


MCV    (80.0-100.0)  fL


 


MCH    (25.0-35.0)  pg


 


MCHC    (31.0-37.0)  g/dL


 


RDW    (11.5-15.5)  %


 


Plt Count    (150-450)  k/uL


 


Neutrophils %    %


 


Lymphocytes %    %


 


Monocytes %    %


 


Eosinophils %    %


 


Basophils %    %


 


Neutrophils #    (1.3-7.7)  k/uL


 


Lymphocytes #    (1.0-4.8)  k/uL


 


Monocytes #    (0-1.0)  k/uL


 


Eosinophils #    (0-0.7)  k/uL


 


Basophils #    (0-0.2)  k/uL


 


PT    (9.0-12.0)  sec


 


INR    (<1.2)  


 


APTT    (22.0-30.0)  sec


 


Sodium    (137-145)  mmol/L


 


Potassium    (3.5-5.1)  mmol/L


 


Chloride    ()  mmol/L


 


Carbon Dioxide    (22-30)  mmol/L


 


Anion Gap    mmol/L


 


BUN    (9-20)  mg/dL


 


Creatinine    (0.66-1.25)  mg/dL


 


Est GFR (CKD-EPI)AfAm    (>60 ml/min/1.73 sqM)  


 


Est GFR (CKD-EPI)NonAf    (>60 ml/min/1.73 sqM)  


 


Glucose    (74-99)  mg/dL


 


Plasma Lactic Acid Gildardo  1.5   (0.7-2.0)  mmol/L


 


Calcium    (8.4-10.2)  mg/dL


 


Phosphorus    (2.5-4.5)  mg/dL


 


Magnesium    (1.6-2.3)  mg/dL


 


Total Bilirubin    (0.2-1.3)  mg/dL


 


AST    (17-59)  U/L


 


ALT    (4-49)  U/L


 


Alkaline Phosphatase    ()  U/L


 


Creatine Kinase    ()  U/L


 


Troponin I   <0.012  (0.000-0.034)  ng/mL


 


Total Protein    (6.3-8.2)  g/dL


 


Albumin    (3.5-5.0)  g/dL














Disposition


Clinical Impression: 


 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





Disposition: OTHER INSTITUTION NOT DEFINED


Condition: Fair


Is patient prescribed a controlled substance at d/c from ED?: No


Referrals: 


Zenia Boudreaux MD [Primary Care Provider] - 1-2 days





- Out of Hospital Transfer - Req. Specs


Out of Hospital Transfer - Requested Specifics: Other Emergency Center ([facility])